# Patient Record
Sex: MALE | Race: WHITE | NOT HISPANIC OR LATINO | Employment: FULL TIME | URBAN - NONMETROPOLITAN AREA
[De-identification: names, ages, dates, MRNs, and addresses within clinical notes are randomized per-mention and may not be internally consistent; named-entity substitution may affect disease eponyms.]

---

## 2017-02-14 PROBLEM — N52.9 ED (ERECTILE DYSFUNCTION): Status: ACTIVE | Noted: 2017-02-14

## 2017-02-14 PROBLEM — D45 POLYCYTHEMIA VERA (HCC): Status: ACTIVE | Noted: 2017-02-14

## 2017-02-14 PROBLEM — E78.5 HYPERLIPIDEMIA: Status: ACTIVE | Noted: 2017-02-14

## 2017-02-14 PROBLEM — E66.3 OVERWEIGHT: Status: ACTIVE | Noted: 2017-02-14

## 2017-02-14 PROBLEM — R73.9 HYPERGLYCEMIA: Status: ACTIVE | Noted: 2017-02-14

## 2017-02-14 PROBLEM — I10 HYPERTENSION: Status: ACTIVE | Noted: 2017-02-14

## 2017-02-16 ENCOUNTER — OFFICE VISIT (OUTPATIENT)
Dept: INTERNAL MEDICINE | Facility: CLINIC | Age: 52
End: 2017-02-16

## 2017-02-16 VITALS
BODY MASS INDEX: 30.64 KG/M2 | RESPIRATION RATE: 14 BRPM | HEART RATE: 68 BPM | DIASTOLIC BLOOD PRESSURE: 78 MMHG | TEMPERATURE: 97.6 F | OXYGEN SATURATION: 98 % | WEIGHT: 214 LBS | HEIGHT: 70 IN | SYSTOLIC BLOOD PRESSURE: 114 MMHG

## 2017-02-16 DIAGNOSIS — E66.3 OVERWEIGHT: ICD-10-CM

## 2017-02-16 DIAGNOSIS — Z23 NEED FOR VACCINATION: ICD-10-CM

## 2017-02-16 DIAGNOSIS — D45 POLYCYTHEMIA VERA (HCC): ICD-10-CM

## 2017-02-16 DIAGNOSIS — R53.83 OTHER FATIGUE: ICD-10-CM

## 2017-02-16 DIAGNOSIS — N52.9 ERECTILE DYSFUNCTION, UNSPECIFIED ERECTILE DYSFUNCTION TYPE: ICD-10-CM

## 2017-02-16 DIAGNOSIS — E78.5 HYPERLIPIDEMIA, UNSPECIFIED HYPERLIPIDEMIA TYPE: Primary | ICD-10-CM

## 2017-02-16 DIAGNOSIS — R73.9 HYPERGLYCEMIA: ICD-10-CM

## 2017-02-16 DIAGNOSIS — I10 ESSENTIAL HYPERTENSION: ICD-10-CM

## 2017-02-16 DIAGNOSIS — E55.9 VITAMIN D DEFICIENCY DISEASE: ICD-10-CM

## 2017-02-16 PROCEDURE — 99396 PREV VISIT EST AGE 40-64: CPT | Performed by: INTERNAL MEDICINE

## 2017-02-16 PROCEDURE — 99213 OFFICE O/P EST LOW 20 MIN: CPT | Performed by: INTERNAL MEDICINE

## 2017-02-16 PROCEDURE — 36415 COLL VENOUS BLD VENIPUNCTURE: CPT | Performed by: INTERNAL MEDICINE

## 2017-02-16 RX ORDER — LISINOPRIL 20 MG/1
20 TABLET ORAL DAILY
Qty: 90 TABLET | Refills: 3 | Status: SHIPPED | OUTPATIENT
Start: 2017-02-16 | End: 2018-02-23 | Stop reason: SDUPTHER

## 2017-02-16 NOTE — PROGRESS NOTES
Subjective   Moreno Lorenzo is a 51 y.o. male and is here for a comprehensive physical exam. Patient here for annual physical. Patient also has multiple medical problems to be followed up. Below is a level III office visit note.  Hypertension stable medication.  Sugar elevated her need a blood test.  Dyslipidemia need a blood test.  Weight is still elevated.  Patient complains of feeling tired times and goes also erectile dysfunction after taking blood pressure medication.  Vitamin D low on supplement to stable.  Abnormal liver enzymes in the past and needs a repeat.    Do you take any herbs or supplements that were not prescribed by a doctor? no  Are you taking calcium supplements? no  Are you taking aspirin daily? no      The following portions of the patient's history were reviewed and updated as appropriate: allergies, current medications, past family history, past medical history, past social history, past surgical history and problem list.      Review of Systems   Constitutional: Negative.    HENT: Negative.    Eyes: Negative.    Respiratory: Negative.    Cardiovascular: Negative.    Gastrointestinal: Negative.    Endocrine: Negative.    Genitourinary: Negative.    Musculoskeletal: Negative.    Skin: Negative.    Allergic/Immunologic: Negative.    Neurological: Negative.    Hematological: Negative.    Psychiatric/Behavioral: Negative.    All other systems reviewed and are negative.        Physical Exam   Constitutional: He is oriented to person, place, and time. He appears well-developed and well-nourished.   HENT:   Head: Normocephalic and atraumatic.   Right Ear: External ear normal.   Left Ear: External ear normal.   Nose: Nose normal.   Mouth/Throat: Oropharynx is clear and moist.   Eyes: Conjunctivae and EOM are normal. Pupils are equal, round, and reactive to light.   Neck: Normal range of motion. Neck supple. No thyromegaly present.   Cardiovascular: Normal rate, regular rhythm, normal heart sounds and  intact distal pulses.    Pulmonary/Chest: Effort normal and breath sounds normal.   Abdominal: Soft. Bowel sounds are normal.   Genitourinary: Rectum normal, prostate normal and penis normal.   Musculoskeletal: Normal range of motion.   Neurological: He is alert and oriented to person, place, and time. He has normal reflexes.   Skin: Skin is warm and dry.   Onychomycosis   Psychiatric: He has a normal mood and affect. His behavior is normal. Judgment and thought content normal.   Nursing note and vitals reviewed.      All  tests have been reviewed.    Assessment/Plan          1. Patient Counseling:  --Nutrition: Stressed importance of moderation in sodium/caffeine intake, saturated fat and cholesterol, caloric balance, sufficient intake of fresh fruits, vegetables, fiber, calcium and iron.  --Exercise: Stressed the importance of regular exercise.   --Injury prevention: Discussed safety belts, safety helmets, smoke detector, smoking near bedding or upholstery.   --Dental health: Discussed importance of regular tooth brushing, flossing, and dental visits.  --Immunizations reviewed.  --Discussed benefits of screening colonoscopy.  --After hours service discussed with patient    2. Discussed the patient's BMI with him.            HTN continue lisinopril 20mg cut down to 10mg due ED and fatigue  decline flu shot.  hyperglycemia repeat normal do lab  Hyperlipidemia diet TG high do lab  overweight diet and exercise,counseling  loss of libido ED  Decrease lisinopril to 10mg  vitD low continue vitD3 1000u daily  onyco no help after medicine by derm  ALT 44, diet, repeat  colonoscopy, refuse now again  Annual physical next year

## 2017-02-20 LAB
25(OH)D3 SERPL-MCNC: 32.5 NG/ML
ALBUMIN SERPL-MCNC: 4.5 G/DL (ref 3.2–4.8)
ALBUMIN/GLOB SERPL: 1.7 G/DL (ref 1.5–2.5)
ALP SERPL-CCNC: 107 U/L (ref 25–100)
ALT SERPL W P-5'-P-CCNC: 42 U/L (ref 7–40)
ANION GAP SERPL CALCULATED.3IONS-SCNC: 6 MMOL/L (ref 3–11)
ARTICHOKE IGE QN: 171 MG/DL (ref 0–130)
AST SERPL-CCNC: 31 U/L (ref 0–33)
BASOPHILS # BLD AUTO: 0.02 10*3/MM3 (ref 0–0.2)
BASOPHILS NFR BLD AUTO: 0.3 % (ref 0–1)
BILIRUB SERPL-MCNC: 0.7 MG/DL (ref 0.3–1.2)
BILIRUB UR QL STRIP: NEGATIVE
BUN BLD-MCNC: 19 MG/DL (ref 9–23)
BUN/CREAT SERPL: 17.3 (ref 7–25)
CALCIUM SPEC-SCNC: 9.6 MG/DL (ref 8.7–10.4)
CHLORIDE SERPL-SCNC: 104 MMOL/L (ref 99–109)
CHOLEST SERPL-MCNC: 255 MG/DL (ref 0–200)
CLARITY UR: CLEAR
CO2 SERPL-SCNC: 30 MMOL/L (ref 20–31)
COLOR UR: YELLOW
CREAT BLD-MCNC: 1.1 MG/DL (ref 0.6–1.3)
DEPRECATED RDW RBC AUTO: 42.3 FL (ref 37–54)
EOSINOPHIL # BLD AUTO: 0.13 10*3/MM3 (ref 0.1–0.3)
EOSINOPHIL NFR BLD AUTO: 2.1 % (ref 0–3)
ERYTHROCYTE [DISTWIDTH] IN BLOOD BY AUTOMATED COUNT: 13.2 % (ref 11.3–14.5)
FOLATE SERPL-MCNC: 12.65 NG/ML (ref 3.2–20)
GFR SERPL CREATININE-BSD FRML MDRD: 70 ML/MIN/1.73
GLOBULIN UR ELPH-MCNC: 2.7 GM/DL
GLUCOSE BLD-MCNC: 89 MG/DL (ref 70–100)
GLUCOSE UR STRIP-MCNC: NEGATIVE MG/DL
HBA1C MFR BLD: 5.6 % (ref 4.8–5.6)
HCT VFR BLD AUTO: 47 % (ref 38.9–50.9)
HCV AB SER DONR QL: NORMAL
HDLC SERPL-MCNC: 36 MG/DL (ref 40–60)
HGB BLD-MCNC: 16.1 G/DL (ref 13.1–17.5)
HGB UR QL STRIP.AUTO: NEGATIVE
IMM GRANULOCYTES # BLD: 0.01 10*3/MM3 (ref 0–0.03)
IMM GRANULOCYTES NFR BLD: 0.2 % (ref 0–0.6)
KETONES UR QL STRIP: NEGATIVE
LEUKOCYTE ESTERASE UR QL STRIP.AUTO: NEGATIVE
LYMPHOCYTES # BLD AUTO: 1.94 10*3/MM3 (ref 0.6–4.8)
LYMPHOCYTES NFR BLD AUTO: 31.1 % (ref 24–44)
MCH RBC QN AUTO: 30 PG (ref 27–31)
MCHC RBC AUTO-ENTMCNC: 34.3 G/DL (ref 32–36)
MCV RBC AUTO: 87.7 FL (ref 80–99)
MONOCYTES # BLD AUTO: 0.39 10*3/MM3 (ref 0–1)
MONOCYTES NFR BLD AUTO: 6.3 % (ref 0–12)
NEUTROPHILS # BLD AUTO: 3.75 10*3/MM3 (ref 1.5–8.3)
NEUTROPHILS NFR BLD AUTO: 60 % (ref 41–71)
NITRITE UR QL STRIP: NEGATIVE
PH UR STRIP.AUTO: <=5 [PH] (ref 5–8)
PLATELET # BLD AUTO: 162 10*3/MM3 (ref 150–450)
PMV BLD AUTO: 9.9 FL (ref 6–12)
POTASSIUM BLD-SCNC: 5 MMOL/L (ref 3.5–5.5)
PROT SERPL-MCNC: 7.2 G/DL (ref 5.7–8.2)
PROT UR QL STRIP: NEGATIVE
PSA SERPL-MCNC: 0.4 NG/ML (ref 0–4)
RBC # BLD AUTO: 5.36 10*6/MM3 (ref 4.2–5.76)
SODIUM BLD-SCNC: 140 MMOL/L (ref 132–146)
SP GR UR STRIP: 1.02 (ref 1–1.03)
TESTOST SERPL-MCNC: 289.93 NG/DL (ref 10–1500)
TRIGL SERPL-MCNC: 353 MG/DL (ref 0–150)
TSH SERPL DL<=0.05 MIU/L-ACNC: 2.14 MIU/ML (ref 0.35–5.35)
UROBILINOGEN UR QL STRIP: NORMAL
VIT B12 BLD-MCNC: 621 PG/ML (ref 211–911)
WBC NRBC COR # BLD: 6.24 10*3/MM3 (ref 3.5–10.8)

## 2017-02-20 PROCEDURE — 81003 URINALYSIS AUTO W/O SCOPE: CPT | Performed by: INTERNAL MEDICINE

## 2017-02-20 PROCEDURE — 90471 IMMUNIZATION ADMIN: CPT | Performed by: INTERNAL MEDICINE

## 2017-02-20 PROCEDURE — 84403 ASSAY OF TOTAL TESTOSTERONE: CPT | Performed by: INTERNAL MEDICINE

## 2017-02-20 PROCEDURE — 80053 COMPREHEN METABOLIC PANEL: CPT | Performed by: INTERNAL MEDICINE

## 2017-02-20 PROCEDURE — 90715 TDAP VACCINE 7 YRS/> IM: CPT | Performed by: INTERNAL MEDICINE

## 2017-02-20 PROCEDURE — 83036 HEMOGLOBIN GLYCOSYLATED A1C: CPT | Performed by: INTERNAL MEDICINE

## 2017-02-20 PROCEDURE — 84153 ASSAY OF PSA TOTAL: CPT | Performed by: INTERNAL MEDICINE

## 2017-02-20 PROCEDURE — 82306 VITAMIN D 25 HYDROXY: CPT | Performed by: INTERNAL MEDICINE

## 2017-02-20 PROCEDURE — 85025 COMPLETE CBC W/AUTO DIFF WBC: CPT | Performed by: INTERNAL MEDICINE

## 2017-02-20 PROCEDURE — 86803 HEPATITIS C AB TEST: CPT | Performed by: INTERNAL MEDICINE

## 2017-02-20 PROCEDURE — 84443 ASSAY THYROID STIM HORMONE: CPT | Performed by: INTERNAL MEDICINE

## 2017-02-20 PROCEDURE — 80061 LIPID PANEL: CPT | Performed by: INTERNAL MEDICINE

## 2017-02-20 PROCEDURE — 82746 ASSAY OF FOLIC ACID SERUM: CPT | Performed by: INTERNAL MEDICINE

## 2017-02-20 PROCEDURE — 82607 VITAMIN B-12: CPT | Performed by: INTERNAL MEDICINE

## 2018-02-23 ENCOUNTER — OFFICE VISIT (OUTPATIENT)
Dept: INTERNAL MEDICINE | Facility: CLINIC | Age: 53
End: 2018-02-23

## 2018-02-23 VITALS
HEIGHT: 70 IN | OXYGEN SATURATION: 96 % | TEMPERATURE: 98.1 F | BODY MASS INDEX: 30.92 KG/M2 | RESPIRATION RATE: 14 BRPM | HEART RATE: 64 BPM | SYSTOLIC BLOOD PRESSURE: 120 MMHG | WEIGHT: 216 LBS | DIASTOLIC BLOOD PRESSURE: 80 MMHG

## 2018-02-23 DIAGNOSIS — R73.9 HYPERGLYCEMIA: ICD-10-CM

## 2018-02-23 DIAGNOSIS — E55.9 VITAMIN D DEFICIENCY DISEASE: ICD-10-CM

## 2018-02-23 DIAGNOSIS — I10 ESSENTIAL HYPERTENSION: ICD-10-CM

## 2018-02-23 DIAGNOSIS — E78.5 HYPERLIPIDEMIA, UNSPECIFIED HYPERLIPIDEMIA TYPE: Primary | ICD-10-CM

## 2018-02-23 DIAGNOSIS — N52.9 ERECTILE DYSFUNCTION, UNSPECIFIED ERECTILE DYSFUNCTION TYPE: ICD-10-CM

## 2018-02-23 DIAGNOSIS — E66.3 OVERWEIGHT: ICD-10-CM

## 2018-02-23 PROBLEM — D45 POLYCYTHEMIA VERA (HCC): Status: RESOLVED | Noted: 2017-02-14 | Resolved: 2018-02-23

## 2018-02-23 PROCEDURE — 99396 PREV VISIT EST AGE 40-64: CPT | Performed by: INTERNAL MEDICINE

## 2018-02-23 RX ORDER — LISINOPRIL 20 MG/1
20 TABLET ORAL DAILY
Qty: 90 TABLET | Refills: 3 | Status: SHIPPED | OUTPATIENT
Start: 2018-02-23 | End: 2018-03-07 | Stop reason: SDUPTHER

## 2018-02-23 NOTE — PROGRESS NOTES
Zofia Lorenzo is a 52 y.o. male and is here for a comprehensive physical exam.     Do you take any herbs or supplements that were not prescribed by a doctor? no  Are you taking calcium supplements? no  Are you taking aspirin daily? no      The following portions of the patient's history were reviewed and updated as appropriate: allergies, current medications, past family history, past medical history, past social history, past surgical history and problem list.      Review of Systems   Constitutional: Negative.    HENT: Negative.    Eyes: Negative.    Respiratory: Negative.    Cardiovascular: Negative.    Gastrointestinal: Negative.    Endocrine: Negative.    Genitourinary: Negative.    Musculoskeletal: Negative.    Skin: Negative.    Allergic/Immunologic: Negative.    Neurological: Negative.    Hematological: Negative.    Psychiatric/Behavioral: Negative.    All other systems reviewed and are negative.        Physical Exam   Constitutional: He is oriented to person, place, and time. He appears well-developed and well-nourished.   HENT:   Head: Normocephalic and atraumatic.   Right Ear: External ear normal.   Left Ear: External ear normal.   Nose: Nose normal.   Mouth/Throat: Oropharynx is clear and moist.   Eyes: Conjunctivae and EOM are normal. Pupils are equal, round, and reactive to light.   Neck: Normal range of motion. Neck supple. No thyromegaly present.   Cardiovascular: Normal rate, regular rhythm, normal heart sounds and intact distal pulses.    Pulmonary/Chest: Effort normal and breath sounds normal.   Abdominal: Soft. Bowel sounds are normal.   Genitourinary: Rectum normal, prostate normal and penis normal.   Musculoskeletal: Normal range of motion.   Neurological: He is alert and oriented to person, place, and time. He has normal reflexes.   Skin: Skin is warm and dry.   Psychiatric: He has a normal mood and affect. His behavior is normal. Judgment and thought content normal.   Nursing note  and vitals reviewed.      All  tests have been reviewed.    Assessment/Plan          1. Patient Counseling:  --Nutrition: Stressed importance of moderation in sodium/caffeine intake, saturated fat and cholesterol, caloric balance, sufficient intake of fresh fruits, vegetables, fiber, calcium and iron.  --Exercise: Stressed the importance of regular exercise.   --Injury prevention: Discussed safety belts, safety helmets, smoke detector, smoking near bedding or upholstery.   --Dental health: Discussed importance of regular tooth brushing, flossing, and dental visits.  --Immunizations reviewed.  --Discussed benefits of screening colonoscopy.  --After hours service discussed with patient    2. Discussed the patient's BMI with him.            HTN continue lisinopril 10mg,   decline flu shot.  hyperglycemia repeat normal do lab  Hyperlipidemia diet TG high do lab  overweight diet and exercise,counseling  libido ED  Do labs  vitD low continue vitD3 1000u daily  onyco no help after medicine by derm  ALT 44, diet, repeat  colonoscopy, refuse now again 2/2018 explained risk of cancer for early detection  Annual physical next year

## 2018-02-24 LAB
25(OH)D3+25(OH)D2 SERPL-MCNC: 50.8 NG/ML
ALBUMIN SERPL-MCNC: 4.4 G/DL (ref 3.5–5)
ALBUMIN/GLOB SERPL: 1.6 G/DL (ref 1–2)
ALP SERPL-CCNC: 87 U/L (ref 38–126)
ALT SERPL-CCNC: 84 U/L (ref 13–69)
APPEARANCE UR: CLEAR
AST SERPL-CCNC: 56 U/L (ref 15–46)
BASOPHILS # BLD AUTO: 0.03 10*3/MM3 (ref 0–0.2)
BASOPHILS NFR BLD AUTO: 0.5 % (ref 0–2.5)
BILIRUB SERPL-MCNC: 0.5 MG/DL (ref 0.2–1.3)
BILIRUB UR QL STRIP: NEGATIVE
BUN SERPL-MCNC: 15 MG/DL (ref 7–20)
BUN/CREAT SERPL: 13.6 (ref 6.3–21.9)
CALCIUM SERPL-MCNC: 9.1 MG/DL (ref 8.4–10.2)
CHLORIDE SERPL-SCNC: 104 MMOL/L (ref 98–107)
CHOLEST SERPL-MCNC: 207 MG/DL (ref 0–199)
CO2 SERPL-SCNC: 26 MMOL/L (ref 26–30)
COLOR UR: YELLOW
CREAT SERPL-MCNC: 1.1 MG/DL (ref 0.6–1.3)
EOSINOPHIL # BLD AUTO: 0.11 10*3/MM3 (ref 0–0.7)
EOSINOPHIL NFR BLD AUTO: 2 % (ref 0–7)
ERYTHROCYTE [DISTWIDTH] IN BLOOD BY AUTOMATED COUNT: 12.5 % (ref 11.5–14.5)
GFR SERPLBLD CREATININE-BSD FMLA CKD-EPI: 70 ML/MIN/1.73
GFR SERPLBLD CREATININE-BSD FMLA CKD-EPI: 85 ML/MIN/1.73
GLOBULIN SER CALC-MCNC: 2.8 GM/DL
GLUCOSE SERPL-MCNC: 93 MG/DL (ref 74–98)
GLUCOSE UR QL: NEGATIVE
HBA1C MFR BLD: 5.6 %
HCT VFR BLD AUTO: 51 % (ref 42–52)
HDLC SERPL-MCNC: 21 MG/DL (ref 40–60)
HGB BLD-MCNC: 17.7 G/DL (ref 14–18)
HGB UR QL STRIP: NEGATIVE
IMM GRANULOCYTES # BLD: 0.01 10*3/MM3 (ref 0–0.06)
IMM GRANULOCYTES NFR BLD: 0.2 % (ref 0–0.6)
KETONES UR QL STRIP: NEGATIVE
LDLC SERPL CALC-MCNC: 137 MG/DL (ref 0–99)
LEUKOCYTE ESTERASE UR QL STRIP: NEGATIVE
LYMPHOCYTES # BLD AUTO: 1.47 10*3/MM3 (ref 0.6–3.4)
LYMPHOCYTES NFR BLD AUTO: 26.9 % (ref 10–50)
MCH RBC QN AUTO: 29.7 PG (ref 27–31)
MCHC RBC AUTO-ENTMCNC: 34.7 G/DL (ref 30–37)
MCV RBC AUTO: 85.6 FL (ref 80–94)
MONOCYTES # BLD AUTO: 0.49 10*3/MM3 (ref 0–0.9)
MONOCYTES NFR BLD AUTO: 9 % (ref 0–12)
NEUTROPHILS # BLD AUTO: 3.36 10*3/MM3 (ref 2–6.9)
NEUTROPHILS NFR BLD AUTO: 61.4 % (ref 37–80)
NITRITE UR QL STRIP: NEGATIVE
NRBC BLD AUTO-RTO: 0 /100 WBC (ref 0–0)
PH UR STRIP: 6 [PH] (ref 5–8)
PLATELET # BLD AUTO: 223 10*3/MM3 (ref 130–400)
POTASSIUM SERPL-SCNC: 4.6 MMOL/L (ref 3.5–5.1)
PROLACTIN SERPL-MCNC: 10.9 NG/ML (ref 4–15.2)
PROT SERPL-MCNC: 7.2 G/DL (ref 6.3–8.2)
PROT UR QL STRIP: NEGATIVE
RBC # BLD AUTO: 5.96 10*6/MM3 (ref 4.7–6.1)
SODIUM SERPL-SCNC: 145 MMOL/L (ref 137–145)
SP GR UR: NORMAL (ref 1–1.03)
TESTOST SERPL-MCNC: 1066 NG/DL (ref 264–916)
TRIGL SERPL-MCNC: 245 MG/DL
TSH SERPL DL<=0.005 MIU/L-ACNC: 2.21 MIU/ML (ref 0.47–4.68)
UROBILINOGEN UR STRIP-MCNC: NORMAL MG/DL
VLDLC SERPL CALC-MCNC: 49 MG/DL
WBC # BLD AUTO: 5.47 10*3/MM3 (ref 4.8–10.8)

## 2018-02-28 ENCOUNTER — RESULTS ENCOUNTER (OUTPATIENT)
Dept: INTERNAL MEDICINE | Facility: CLINIC | Age: 53
End: 2018-02-28

## 2018-02-28 DIAGNOSIS — E66.3 OVERWEIGHT: ICD-10-CM

## 2018-02-28 DIAGNOSIS — I10 ESSENTIAL HYPERTENSION: ICD-10-CM

## 2018-02-28 DIAGNOSIS — E78.5 HYPERLIPIDEMIA, UNSPECIFIED HYPERLIPIDEMIA TYPE: ICD-10-CM

## 2018-02-28 DIAGNOSIS — N52.9 ERECTILE DYSFUNCTION, UNSPECIFIED ERECTILE DYSFUNCTION TYPE: ICD-10-CM

## 2018-02-28 DIAGNOSIS — R73.9 HYPERGLYCEMIA: ICD-10-CM

## 2018-03-07 ENCOUNTER — OFFICE VISIT (OUTPATIENT)
Dept: INTERNAL MEDICINE | Facility: CLINIC | Age: 53
End: 2018-03-07

## 2018-03-07 VITALS
DIASTOLIC BLOOD PRESSURE: 98 MMHG | WEIGHT: 216 LBS | BODY MASS INDEX: 30.92 KG/M2 | HEART RATE: 80 BPM | SYSTOLIC BLOOD PRESSURE: 150 MMHG | RESPIRATION RATE: 14 BRPM | OXYGEN SATURATION: 95 % | TEMPERATURE: 97.9 F | HEIGHT: 70 IN

## 2018-03-07 DIAGNOSIS — N52.9 ERECTILE DYSFUNCTION, UNSPECIFIED ERECTILE DYSFUNCTION TYPE: ICD-10-CM

## 2018-03-07 DIAGNOSIS — E66.3 OVERWEIGHT: ICD-10-CM

## 2018-03-07 DIAGNOSIS — R74.8 ABNORMAL LIVER ENZYMES: ICD-10-CM

## 2018-03-07 DIAGNOSIS — E78.5 HYPERLIPIDEMIA, UNSPECIFIED HYPERLIPIDEMIA TYPE: Primary | ICD-10-CM

## 2018-03-07 DIAGNOSIS — R73.9 HYPERGLYCEMIA: ICD-10-CM

## 2018-03-07 DIAGNOSIS — E55.9 VITAMIN D DEFICIENCY DISEASE: ICD-10-CM

## 2018-03-07 DIAGNOSIS — I10 ESSENTIAL HYPERTENSION: ICD-10-CM

## 2018-03-07 PROCEDURE — 99214 OFFICE O/P EST MOD 30 MIN: CPT | Performed by: INTERNAL MEDICINE

## 2018-03-07 RX ORDER — TESTOSTERONE 16.2 MG/G
GEL TRANSDERMAL
Qty: 75 G | Refills: 3 | Status: SHIPPED | OUTPATIENT
Start: 2018-03-07 | End: 2018-05-17 | Stop reason: SDUPTHER

## 2018-03-07 RX ORDER — ABACAVIR , LAMIVUDINE AND ZIDOVUDINE 150; 300; 300 MG/1; MG/1; MG/1
1 TABLET ORAL 2 TIMES DAILY
COMMUNITY
End: 2018-09-21

## 2018-03-07 RX ORDER — TADALAFIL 5 MG/1
5 TABLET ORAL DAILY PRN
Qty: 30 TABLET | Refills: 2 | Status: SHIPPED | OUTPATIENT
Start: 2018-03-07 | End: 2018-09-21

## 2018-03-07 RX ORDER — LISINOPRIL 20 MG/1
20 TABLET ORAL DAILY
Qty: 90 TABLET | Refills: 3 | Status: SHIPPED | OUTPATIENT
Start: 2018-03-07 | End: 2019-04-14 | Stop reason: SDUPTHER

## 2018-03-07 NOTE — PROGRESS NOTES
Zofia Lorenzo is a 52 y.o. male.     Chief Complaint   Patient presents with   • Follow-up   • Labs Only       History of Present Illness   Patient here for follow-up.  Blood pressure elevated today.  Patient states stress at work.  Patient is taking lisinopril 10 mg daily.  Sugar repeat normal.  Cholesterol elevated.  Weight is still high.  Libido erectile dysfunction no significant improvement after testosterone over-the-counter supplement.  Onychomycosis is status post Lamisil now patient has abnormal liver enzymes.    Current Outpatient Prescriptions:   •  lisinopril (PRINIVIL,ZESTRIL) 20 MG tablet, Take 1 tablet by mouth Daily., Disp: 90 tablet, Rfl: 3  •  abacavir-lamiVUDine-zidovudine (TRIZIVIR) 300-150-300 MG per tablet, Take 1 tablet by mouth 2 (Two) Times a Day., Disp: , Rfl:     The following portions of the patient's history were reviewed and updated as appropriate: allergies, current medications, past family history, past medical history, past social history, past surgical history and problem list.    Review of Systems   Constitutional: Negative.    Respiratory: Negative.    Cardiovascular: Negative.    Gastrointestinal: Negative.    Musculoskeletal: Negative.    Skin: Negative.    Neurological: Negative.    Psychiatric/Behavioral: Negative.        Objective   Physical Exam   Constitutional: He is oriented to person, place, and time. He appears well-nourished.   Neck: Neck supple.   Cardiovascular: Normal rate, regular rhythm and normal heart sounds.    Pulmonary/Chest: Effort normal and breath sounds normal.   Abdominal: Bowel sounds are normal.   Neurological: He is alert and oriented to person, place, and time.   Skin: Skin is warm.   Psychiatric: He has a normal mood and affect.       All tests have been reviewed.    Assessment/Plan   There are no diagnoses linked to this encounter.          HTN continue lisinopril 10mg, increase to 20--  decline flu shot.  hyperglycemia repeat normal    Hyperlipidemia diet TG high diet  overweight diet and exercise,counseling  libido ED start cialis daily patient requests, testo no help  Hypogonadism patient is taking testo supplement over the counter, encourage patient to d/c and start androgel.--  vitD low continue vitD3 1000u daily change to qod  Onyco s/p lamisil for 3 mo now liver enzymes elevated. Watch for now.  colonoscopy, refuse now again 2/2018 explained risk of cancer for early detection  10 week after labs

## 2018-03-12 ENCOUNTER — RESULTS ENCOUNTER (OUTPATIENT)
Dept: INTERNAL MEDICINE | Facility: CLINIC | Age: 53
End: 2018-03-12

## 2018-03-12 DIAGNOSIS — I10 ESSENTIAL HYPERTENSION: ICD-10-CM

## 2018-03-12 DIAGNOSIS — R73.9 HYPERGLYCEMIA: ICD-10-CM

## 2018-03-12 DIAGNOSIS — E55.9 VITAMIN D DEFICIENCY DISEASE: ICD-10-CM

## 2018-03-12 DIAGNOSIS — E78.5 HYPERLIPIDEMIA, UNSPECIFIED HYPERLIPIDEMIA TYPE: ICD-10-CM

## 2018-03-12 DIAGNOSIS — R74.8 ABNORMAL LIVER ENZYMES: ICD-10-CM

## 2018-05-15 LAB
ALBUMIN SERPL-MCNC: 4.1 G/DL (ref 3.5–5)
ALP SERPL-CCNC: 79 U/L (ref 38–126)
ALT SERPL-CCNC: 69 U/L (ref 13–69)
AST SERPL-CCNC: 46 U/L (ref 15–46)
BILIRUB DIRECT SERPL-MCNC: 0.2 MG/DL (ref 0–0.4)
BILIRUB SERPL-MCNC: 0.5 MG/DL (ref 0.2–1.3)
BUN SERPL-MCNC: 20 MG/DL (ref 7–20)
BUN/CREAT SERPL: 20 (ref 6.3–21.9)
CALCIUM SERPL-MCNC: 8.7 MG/DL (ref 8.4–10.2)
CHLORIDE SERPL-SCNC: 103 MMOL/L (ref 98–107)
CO2 SERPL-SCNC: 27 MMOL/L (ref 26–30)
CREAT SERPL-MCNC: 1 MG/DL (ref 0.6–1.3)
GFR SERPLBLD CREATININE-BSD FMLA CKD-EPI: 78 ML/MIN/1.73
GFR SERPLBLD CREATININE-BSD FMLA CKD-EPI: 94 ML/MIN/1.73
GLUCOSE SERPL-MCNC: 103 MG/DL (ref 74–98)
POTASSIUM SERPL-SCNC: 4.3 MMOL/L (ref 3.5–5.1)
PROT SERPL-MCNC: 6.9 G/DL (ref 6.3–8.2)
PSA SERPL-MCNC: 0.44 NG/ML (ref 0.06–4)
SODIUM SERPL-SCNC: 142 MMOL/L (ref 137–145)

## 2018-05-16 LAB
25(OH)D3+25(OH)D2 SERPL-MCNC: 35.1 NG/ML
TESTOST SERPL-MCNC: 273 NG/DL (ref 264–916)

## 2018-05-17 ENCOUNTER — OFFICE VISIT (OUTPATIENT)
Dept: INTERNAL MEDICINE | Facility: CLINIC | Age: 53
End: 2018-05-17

## 2018-05-17 VITALS
TEMPERATURE: 97.8 F | HEIGHT: 70 IN | DIASTOLIC BLOOD PRESSURE: 80 MMHG | RESPIRATION RATE: 12 BRPM | OXYGEN SATURATION: 99 % | BODY MASS INDEX: 30.64 KG/M2 | HEART RATE: 63 BPM | WEIGHT: 214 LBS | SYSTOLIC BLOOD PRESSURE: 122 MMHG

## 2018-05-17 DIAGNOSIS — E55.9 VITAMIN D DEFICIENCY DISEASE: ICD-10-CM

## 2018-05-17 DIAGNOSIS — E29.1 HYPOGONADISM IN MALE: ICD-10-CM

## 2018-05-17 DIAGNOSIS — E66.3 OVERWEIGHT: ICD-10-CM

## 2018-05-17 DIAGNOSIS — N52.9 ERECTILE DYSFUNCTION, UNSPECIFIED ERECTILE DYSFUNCTION TYPE: ICD-10-CM

## 2018-05-17 DIAGNOSIS — E78.5 HYPERLIPIDEMIA, UNSPECIFIED HYPERLIPIDEMIA TYPE: Primary | ICD-10-CM

## 2018-05-17 DIAGNOSIS — R73.9 HYPERGLYCEMIA: ICD-10-CM

## 2018-05-17 DIAGNOSIS — I10 ESSENTIAL HYPERTENSION: ICD-10-CM

## 2018-05-17 PROCEDURE — 99214 OFFICE O/P EST MOD 30 MIN: CPT | Performed by: INTERNAL MEDICINE

## 2018-05-17 RX ORDER — TESTOSTERONE 16.2 MG/G
GEL TRANSDERMAL
Qty: 75 G | Refills: 3 | Status: SHIPPED | OUTPATIENT
Start: 2018-05-17 | End: 2018-09-21

## 2018-05-17 NOTE — PROGRESS NOTES
Zofia Lorenzo is a 52 y.o. male.     Chief Complaint   Patient presents with   • Follow-up     follow up on labs       History of Present Illness   Patient here for follow-up.  Hypertension stable after increase medication.  Hyperglycemia stable on diet 2.  Hyperlipidemia stable diet 2.  Overweight to stable.  Hypogonadism patient is not taking testosterone supplement and now lab showed a testosterone level low.  Vitamin D elevated.    Current Outpatient Prescriptions:   •  abacavir-lamiVUDine-zidovudine (TRIZIVIR) 300-150-300 MG per tablet, Take 1 tablet by mouth 2 (Two) Times a Day., Disp: , Rfl:   •  lisinopril (PRINIVIL,ZESTRIL) 20 MG tablet, Take 1 tablet by mouth Daily., Disp: 90 tablet, Rfl: 3  •  tadalafil (CIALIS) 5 MG tablet, Take 1 tablet by mouth Daily As Needed for erectile dysfunction., Disp: 30 tablet, Rfl: 2  •  Testosterone (ANDROGEL PUMP) 20.25 MG/ACT (1.62%) gel, 1 pump each axilla daily, Disp: 75 g, Rfl: 3    The following portions of the patient's history were reviewed and updated as appropriate: allergies, current medications, past family history, past medical history, past social history, past surgical history and problem list.    Review of Systems   Constitutional: Negative.    Respiratory: Negative.    Cardiovascular: Negative.    Gastrointestinal: Negative.    Musculoskeletal: Negative.    Skin: Negative.    Neurological: Negative.    Psychiatric/Behavioral: Negative.        Objective   Physical Exam   Constitutional: He is oriented to person, place, and time. He appears well-developed and well-nourished.   Neck: Neck supple.   Cardiovascular: Normal rate, regular rhythm and normal heart sounds.    Pulmonary/Chest: Effort normal and breath sounds normal.   Abdominal: Soft. Bowel sounds are normal.   Neurological: He is alert and oriented to person, place, and time.   Psychiatric: He has a normal mood and affect. His behavior is normal.       All tests have been  reviewed.    Assessment/Plan   There are no diagnoses linked to this encounter.          HTN continue lisinopril 20--  decline flu shot.  hyperglycemia repeat normal   Hyperlipidemia  diet  overweight diet and exercise  libido ED declined cialis daily  Hypogonadism patient is taking testo supplement over the counter, encourage patient to d/c and start androgel.--  vitD low continue vitD3 1000u daily change to qod  Onyco s/p lamisil for 3 mo now liver enzymes elevated. Watch for now.  colonoscopy, refuse now again 2/2018 explained risk of cancer for early detection  16 week after labs

## 2018-05-22 ENCOUNTER — RESULTS ENCOUNTER (OUTPATIENT)
Dept: INTERNAL MEDICINE | Facility: CLINIC | Age: 53
End: 2018-05-22

## 2018-05-22 DIAGNOSIS — E29.1 HYPOGONADISM IN MALE: ICD-10-CM

## 2018-05-22 DIAGNOSIS — E78.5 HYPERLIPIDEMIA, UNSPECIFIED HYPERLIPIDEMIA TYPE: ICD-10-CM

## 2018-05-22 DIAGNOSIS — R73.9 HYPERGLYCEMIA: ICD-10-CM

## 2018-09-14 LAB — PSA SERPL-MCNC: 0.44 NG/ML (ref 0.06–4)

## 2018-09-15 LAB
CHOLEST SERPL-MCNC: 237 MG/DL (ref 0–199)
HBA1C MFR BLD: 5.5 %
HDLC SERPL-MCNC: 26 MG/DL (ref 40–60)
LDLC SERPL CALC-MCNC: 171 MG/DL (ref 0–99)
TESTOST SERPL-MCNC: 287 NG/DL (ref 264–916)
TRIGL SERPL-MCNC: 198 MG/DL
VLDLC SERPL CALC-MCNC: 39.6 MG/DL

## 2018-09-21 ENCOUNTER — RESULTS ENCOUNTER (OUTPATIENT)
Dept: INTERNAL MEDICINE | Facility: CLINIC | Age: 53
End: 2018-09-21

## 2018-09-21 ENCOUNTER — OFFICE VISIT (OUTPATIENT)
Dept: INTERNAL MEDICINE | Facility: CLINIC | Age: 53
End: 2018-09-21

## 2018-09-21 VITALS
BODY MASS INDEX: 31.07 KG/M2 | SYSTOLIC BLOOD PRESSURE: 132 MMHG | DIASTOLIC BLOOD PRESSURE: 84 MMHG | HEIGHT: 70 IN | WEIGHT: 217 LBS | HEART RATE: 62 BPM | OXYGEN SATURATION: 96 %

## 2018-09-21 DIAGNOSIS — E66.3 OVERWEIGHT: ICD-10-CM

## 2018-09-21 DIAGNOSIS — R73.9 HYPERGLYCEMIA: ICD-10-CM

## 2018-09-21 DIAGNOSIS — E55.9 VITAMIN D DEFICIENCY DISEASE: ICD-10-CM

## 2018-09-21 DIAGNOSIS — E78.5 HYPERLIPIDEMIA, UNSPECIFIED HYPERLIPIDEMIA TYPE: Primary | ICD-10-CM

## 2018-09-21 DIAGNOSIS — I10 ESSENTIAL HYPERTENSION: ICD-10-CM

## 2018-09-21 DIAGNOSIS — Z12.11 COLON CANCER SCREENING: ICD-10-CM

## 2018-09-21 DIAGNOSIS — N52.9 ERECTILE DYSFUNCTION, UNSPECIFIED ERECTILE DYSFUNCTION TYPE: ICD-10-CM

## 2018-09-21 PROBLEM — R74.8 ABNORMAL LIVER ENZYMES: Status: RESOLVED | Noted: 2018-03-07 | Resolved: 2018-09-21

## 2018-09-21 PROCEDURE — 99214 OFFICE O/P EST MOD 30 MIN: CPT | Performed by: INTERNAL MEDICINE

## 2018-09-21 RX ORDER — TESTOSTERONE 40.5 MG/2.5G
GEL TOPICAL
Qty: 2.5 G | Refills: 5 | Status: SHIPPED | OUTPATIENT
Start: 2018-09-21 | End: 2020-07-02

## 2018-09-21 RX ORDER — KETOCONAZOLE 20 MG/G
CREAM TOPICAL
COMMUNITY
Start: 2018-09-18 | End: 2020-07-02

## 2018-09-21 NOTE — PROGRESS NOTES
Zofia Lorenzo is a 53 y.o. male.     Chief Complaint   Patient presents with   • Follow-up       History of Present Illness   Patient here for follow-up of.  Hypertension stable medication.  Hyperglycemia repeat normal.  Hyperlipidemia cholesterol still elevated.  Weight is still high.  Hypogonadism testosterone is low on medication.  Onychomycosis stable now.    Current Outpatient Prescriptions:   •  ketoconazole (NIZORAL) 2 % cream, , Disp: , Rfl:   •  lisinopril (PRINIVIL,ZESTRIL) 20 MG tablet, Take 1 tablet by mouth Daily., Disp: 90 tablet, Rfl: 3  •  Testosterone (ANDROGEL PUMP) 20.25 MG/ACT (1.62%) gel, 1 pump each axilla daily, Disp: 75 g, Rfl: 3    The following portions of the patient's history were reviewed and updated as appropriate: allergies, current medications, past family history, past medical history, past social history, past surgical history and problem list.    Review of Systems   Constitutional: Negative.    Respiratory: Negative.    Cardiovascular: Negative.    Gastrointestinal: Negative.    Musculoskeletal: Negative.    Skin: Negative.    Neurological: Negative.    Psychiatric/Behavioral: Negative.        Objective   Physical Exam   Constitutional: He is oriented to person, place, and time. He appears well-nourished.   Neck: Neck supple.   Cardiovascular: Normal rate, regular rhythm and normal heart sounds.    Pulmonary/Chest: Effort normal and breath sounds normal.   Abdominal: Bowel sounds are normal.   Neurological: He is alert and oriented to person, place, and time.   Skin: Skin is warm.   Psychiatric: He has a normal mood and affect.       All tests have been reviewed.    Assessment/Plan   There are no diagnoses linked to this encounter.           HTN continue lisinopril 20--  decline flu shot.  hyperglycemia repeat normal   Hyperlipidemia  diet repeat next if still high start medicine  overweight diet and exercise  libido ED declined cialis daily  Hypogonadism patient is  taking testo supplement over the counter, encourage patient to d/c and  androgel, increase to 40.5.--  vitD low continue vitD3 1000u daily changed to qod  Onyco s/p lamisil for 3 mo now liver enzymes elevated.failed.  Watch for now.  colonoscopy, refuse now again 2/2018 explained risk of cancer for early detection. Do cologuard.       2/2019 PE

## 2019-01-21 ENCOUNTER — PRIOR AUTHORIZATION (OUTPATIENT)
Dept: INTERNAL MEDICINE | Facility: CLINIC | Age: 54
End: 2019-01-21

## 2019-03-01 DIAGNOSIS — R73.9 HYPERGLYCEMIA: ICD-10-CM

## 2019-03-01 DIAGNOSIS — E55.9 VITAMIN D DEFICIENCY DISEASE: ICD-10-CM

## 2019-03-01 DIAGNOSIS — E29.1 HYPOGONADISM IN MALE: ICD-10-CM

## 2019-03-01 DIAGNOSIS — I10 ESSENTIAL HYPERTENSION: ICD-10-CM

## 2019-03-01 DIAGNOSIS — E78.5 HYPERLIPIDEMIA, UNSPECIFIED HYPERLIPIDEMIA TYPE: Primary | ICD-10-CM

## 2019-03-02 LAB
25(OH)D3+25(OH)D2 SERPL-MCNC: 42.5 NG/ML (ref 30–100)
ALBUMIN SERPL-MCNC: 4.9 G/DL (ref 3.5–5.5)
ALBUMIN/GLOB SERPL: 1.8 {RATIO} (ref 1.2–2.2)
ALP SERPL-CCNC: 115 IU/L (ref 39–117)
ALT SERPL-CCNC: 22 IU/L (ref 0–44)
AST SERPL-CCNC: 21 IU/L (ref 0–40)
BASOPHILS # BLD AUTO: 0 X10E3/UL (ref 0–0.2)
BASOPHILS NFR BLD AUTO: 1 %
BILIRUB SERPL-MCNC: 0.5 MG/DL (ref 0–1.2)
BUN SERPL-MCNC: 19 MG/DL (ref 6–24)
BUN/CREAT SERPL: 18 (ref 9–20)
CALCIUM SERPL-MCNC: 9.3 MG/DL (ref 8.7–10.2)
CHLORIDE SERPL-SCNC: 100 MMOL/L (ref 96–106)
CHOLEST SERPL-MCNC: 273 MG/DL (ref 100–199)
CO2 SERPL-SCNC: 23 MMOL/L (ref 20–29)
CREAT SERPL-MCNC: 1.04 MG/DL (ref 0.76–1.27)
EOSINOPHIL # BLD AUTO: 0.2 X10E3/UL (ref 0–0.4)
EOSINOPHIL NFR BLD AUTO: 2 %
ERYTHROCYTE [DISTWIDTH] IN BLOOD BY AUTOMATED COUNT: 13.7 % (ref 12.3–15.4)
GLOBULIN SER CALC-MCNC: 2.8 G/DL (ref 1.5–4.5)
GLUCOSE SERPL-MCNC: 105 MG/DL (ref 65–99)
HBA1C MFR BLD: 5.9 % (ref 4.8–5.6)
HCT VFR BLD AUTO: 47.6 % (ref 37.5–51)
HDLC SERPL-MCNC: 31 MG/DL
HGB BLD-MCNC: 16.3 G/DL (ref 13–17.7)
IMM GRANULOCYTES # BLD AUTO: 0 X10E3/UL (ref 0–0.1)
IMM GRANULOCYTES NFR BLD AUTO: 0 %
LABORATORY COMMENT REPORT: ABNORMAL
LDLC SERPL CALC-MCNC: 208 MG/DL (ref 0–99)
LYMPHOCYTES # BLD AUTO: 2.1 X10E3/UL (ref 0.7–3.1)
LYMPHOCYTES NFR BLD AUTO: 32 %
MCH RBC QN AUTO: 29.9 PG (ref 26.6–33)
MCHC RBC AUTO-ENTMCNC: 34.2 G/DL (ref 31.5–35.7)
MCV RBC AUTO: 87 FL (ref 79–97)
MONOCYTES # BLD AUTO: 0.5 X10E3/UL (ref 0.1–0.9)
MONOCYTES NFR BLD AUTO: 7 %
NEUTROPHILS # BLD AUTO: 3.8 X10E3/UL (ref 1.4–7)
NEUTROPHILS NFR BLD AUTO: 58 %
PLATELET # BLD AUTO: 222 X10E3/UL (ref 150–379)
POTASSIUM SERPL-SCNC: 5 MMOL/L (ref 3.5–5.2)
PROT SERPL-MCNC: 7.7 G/DL (ref 6–8.5)
PSA SERPL-MCNC: 0.5 NG/ML (ref 0–4)
RBC # BLD AUTO: 5.46 X10E6/UL (ref 4.14–5.8)
SODIUM SERPL-SCNC: 140 MMOL/L (ref 134–144)
TESTOST SERPL-MCNC: 289 NG/DL (ref 264–916)
TRIGL SERPL-MCNC: 171 MG/DL (ref 0–149)
VLDLC SERPL CALC-MCNC: 34 MG/DL (ref 5–40)
WBC # BLD AUTO: 6.6 X10E3/UL (ref 3.4–10.8)

## 2019-03-08 ENCOUNTER — RESULTS ENCOUNTER (OUTPATIENT)
Dept: INTERNAL MEDICINE | Facility: CLINIC | Age: 54
End: 2019-03-08

## 2019-03-08 ENCOUNTER — OFFICE VISIT (OUTPATIENT)
Dept: INTERNAL MEDICINE | Facility: CLINIC | Age: 54
End: 2019-03-08

## 2019-03-08 VITALS
WEIGHT: 211.8 LBS | DIASTOLIC BLOOD PRESSURE: 80 MMHG | HEART RATE: 71 BPM | BODY MASS INDEX: 30.32 KG/M2 | TEMPERATURE: 97.8 F | SYSTOLIC BLOOD PRESSURE: 128 MMHG | OXYGEN SATURATION: 97 % | HEIGHT: 70 IN

## 2019-03-08 DIAGNOSIS — E66.3 OVERWEIGHT: ICD-10-CM

## 2019-03-08 DIAGNOSIS — R73.9 HYPERGLYCEMIA: ICD-10-CM

## 2019-03-08 DIAGNOSIS — N52.9 ERECTILE DYSFUNCTION, UNSPECIFIED ERECTILE DYSFUNCTION TYPE: ICD-10-CM

## 2019-03-08 DIAGNOSIS — I10 ESSENTIAL HYPERTENSION: ICD-10-CM

## 2019-03-08 DIAGNOSIS — Z12.11 COLON CANCER SCREENING: ICD-10-CM

## 2019-03-08 DIAGNOSIS — E78.5 HYPERLIPIDEMIA, UNSPECIFIED HYPERLIPIDEMIA TYPE: ICD-10-CM

## 2019-03-08 DIAGNOSIS — E55.9 VITAMIN D DEFICIENCY DISEASE: ICD-10-CM

## 2019-03-08 DIAGNOSIS — E78.5 HYPERLIPIDEMIA, UNSPECIFIED HYPERLIPIDEMIA TYPE: Primary | ICD-10-CM

## 2019-03-08 PROCEDURE — 99396 PREV VISIT EST AGE 40-64: CPT | Performed by: INTERNAL MEDICINE

## 2019-03-08 NOTE — PROGRESS NOTES
Zofia Lorenzo is a 53 y.o. male.     Chief Complaint   Patient presents with   • Annual Exam       History of Present Illness   Patient is a 54 y/o male presenting today for annual physical. HTN controlled on med. Hyperglycemia still elevated. Better diet and exercise. HLD elevated, declines medication. Low testosterone, hasn't been on androgel for past 3 weeks due to insurance. Cologuard, never received, repeat. Obesity, weight down 6 pounds.     Current Outpatient Medications:   •  ketoconazole (NIZORAL) 2 % cream, , Disp: , Rfl:   •  lisinopril (PRINIVIL,ZESTRIL) 20 MG tablet, Take 1 tablet by mouth Daily., Disp: 90 tablet, Rfl: 3  •  Testosterone (ANDROGEL) 40.5 MG/2.5GM (1.62%) gel, 2 gi each axilla daily, Disp: 2.5 g, Rfl: 5    The following portions of the patient's history were reviewed and updated as appropriate: allergies, current medications, past family history, past medical history, past social history, past surgical history and problem list.    Review of Systems   Constitutional: Negative.    Respiratory: Negative.    Cardiovascular: Negative.    Gastrointestinal: Negative.    Skin: Negative.    Psychiatric/Behavioral: Negative.        Objective   Physical Exam   Constitutional: He is oriented to person, place, and time. He appears well-developed and well-nourished.   HENT:   Head: Normocephalic and atraumatic.   Right Ear: External ear normal.   Left Ear: External ear normal.   Nose: Nose normal.   Mouth/Throat: Oropharynx is clear and moist.   Eyes: Conjunctivae and EOM are normal. Pupils are equal, round, and reactive to light.   Neck: Normal range of motion. Neck supple. No thyromegaly present.   Cardiovascular: Normal rate, regular rhythm, normal heart sounds and intact distal pulses.   Pulmonary/Chest: Effort normal and breath sounds normal.   Abdominal: Soft. Bowel sounds are normal.   Genitourinary: Rectum normal, prostate normal and penis normal.   Musculoskeletal: Normal range  of motion.   Neurological: He is alert and oriented to person, place, and time. He has normal reflexes.   Skin: Skin is warm and dry.   Psychiatric: He has a normal mood and affect. His behavior is normal. Judgment and thought content normal.   Nursing note and vitals reviewed.      All tests have been reviewed.    Assessment/Plan   There are no diagnoses linked to this encounter.          HTN continue lisinopril 20-- controlled   decline flu shot.  hyperglycemia  elevated still   Hyperlipidemia  -- failed atorvastatin, try crestor  overweight diet and exercise -- weight down 6 lbs  libido ED declined cialis daily  Hypogonadism patient is taking testo supplement over the counter, encourage patient to d/c and  androgel, increase to 40.5.-- not taking for 3 weeks due to insurance, will try compound testo gel  vitD low continue vitD3 1000u daily changed to qod -- not taking, Vit D low normal  Onyco s/p lamisil for 3 mo now liver enzymes elevated.failed.  Watch for now.  colonoscopy, refuse now again 2/2018 explained risk of cancer for early detection. Do cologuard -- never received cologuard, resend      6 weeks after labs

## 2019-03-08 NOTE — PROGRESS NOTES
Zofia Lorenzo is a 53 y.o. male and is here for a comprehensive physical exam.     Do you take any herbs or supplements that were not prescribed by a doctor? no  Are you taking calcium supplements? no  Are you taking aspirin daily? no      The following portions of the patient's history were reviewed and updated as appropriate: allergies, current medications, past family history, past medical history, past social history, past surgical history and problem list.      Review of Systems   Constitutional: Negative.    HENT: Negative.    Eyes: Negative.    Respiratory: Negative.    Cardiovascular: Negative.    Gastrointestinal: Negative.    Endocrine: Negative.    Genitourinary: Negative.    Musculoskeletal: Negative.    Skin: Negative.    Allergic/Immunologic: Negative.    Neurological: Negative.    Hematological: Negative.    Psychiatric/Behavioral: Negative.    All other systems reviewed and are negative.        Physical Exam   Constitutional: He is oriented to person, place, and time. He appears well-developed and well-nourished.   HENT:   Head: Normocephalic and atraumatic.   Right Ear: External ear normal.   Left Ear: External ear normal.   Nose: Nose normal.   Mouth/Throat: Oropharynx is clear and moist.   Eyes: Conjunctivae and EOM are normal. Pupils are equal, round, and reactive to light.   Neck: Normal range of motion. Neck supple. No thyromegaly present.   Cardiovascular: Normal rate, regular rhythm, normal heart sounds and intact distal pulses.   Pulmonary/Chest: Effort normal and breath sounds normal.   Abdominal: Soft. Bowel sounds are normal.   Genitourinary: Rectum normal, prostate normal and penis normal.   Musculoskeletal: Normal range of motion.   Neurological: He is alert and oriented to person, place, and time. He has normal reflexes.   Skin: Skin is warm and dry.   Psychiatric: He has a normal mood and affect. His behavior is normal. Judgment and thought content normal.   Nursing note  and vitals reviewed.      All  tests have been reviewed.    Assessment/Plan          1. Patient Counseling:  --Nutrition: Stressed importance of moderation in sodium/caffeine intake, saturated fat and cholesterol, caloric balance, sufficient intake of fresh fruits, vegetables, fiber, calcium and iron.  --Exercise: Stressed the importance of regular exercise.   --Injury prevention: Discussed safety belts, safety helmets, smoke detector, smoking near bedding or upholstery.   --Dental health: Discussed importance of regular tooth brushing, flossing, and dental visits.  --Immunizations reviewed.  --Discussed benefits of screening colonoscopy.  --After hours service discussed with patient    2. Discussed the patient's BMI with him.            HTN continue lisinopril 20-- controlled   decline flu shot.  hyperglycemia  elevated still   Hyperlipidemia  -- failed atorvastatin, try crestor  overweight diet and exercise -- weight down 6 lbs  libido ED declined cialis daily  Hypogonadism patient is taking testo supplement over the counter, encourage patient to d/c and  androgel, increase to 40.5.-- not taking for 3 weeks due to insurance, will try compound testo gel  vitD low continue vitD3 1000u daily changed to qod -- not taking, Vit D low normal  Onyco s/p lamisil for 3 mo now liver enzymes elevated.failed.  Watch for now.  colonoscopy, refuse now again 2/2018 explained risk of cancer for early detection. Do cologuard -- never received cologuard, resend      6 weeks after labs

## 2019-03-21 DIAGNOSIS — E78.5 HYPERLIPIDEMIA, UNSPECIFIED HYPERLIPIDEMIA TYPE: Primary | ICD-10-CM

## 2019-03-21 RX ORDER — ROSUVASTATIN CALCIUM 10 MG/1
10 TABLET, COATED ORAL DAILY
Qty: 30 TABLET | Refills: 1 | Status: SHIPPED | OUTPATIENT
Start: 2019-03-21 | End: 2019-04-14 | Stop reason: SDUPTHER

## 2019-04-15 RX ORDER — ROSUVASTATIN CALCIUM 10 MG/1
10 TABLET, COATED ORAL DAILY
Qty: 90 TABLET | Refills: 3 | Status: SHIPPED | OUTPATIENT
Start: 2019-04-15 | End: 2020-07-02

## 2019-04-15 RX ORDER — LISINOPRIL 20 MG/1
20 TABLET ORAL DAILY
Qty: 90 TABLET | Refills: 3 | Status: SHIPPED | OUTPATIENT
Start: 2019-04-15 | End: 2021-03-19 | Stop reason: SDUPTHER

## 2019-04-22 DIAGNOSIS — E29.1 HYPOGONADISM IN MALE: ICD-10-CM

## 2019-04-22 DIAGNOSIS — E78.5 HYPERLIPIDEMIA, UNSPECIFIED HYPERLIPIDEMIA TYPE: Primary | ICD-10-CM

## 2019-04-22 RX ORDER — LISINOPRIL 20 MG/1
20 TABLET ORAL DAILY
Qty: 90 TABLET | Refills: 3 | Status: SHIPPED | OUTPATIENT
Start: 2019-04-22 | End: 2020-04-17 | Stop reason: SDUPTHER

## 2019-04-27 LAB
ALBUMIN SERPL-MCNC: 4.3 G/DL (ref 3.5–5)
ALBUMIN/GLOB SERPL: 1.6 G/DL (ref 1–2)
ALP SERPL-CCNC: 82 U/L (ref 38–126)
ALT SERPL-CCNC: 50 U/L (ref 13–69)
AST SERPL-CCNC: 37 U/L (ref 15–46)
BILIRUB SERPL-MCNC: 0.3 MG/DL (ref 0.2–1.3)
BUN SERPL-MCNC: 21 MG/DL (ref 7–20)
BUN/CREAT SERPL: 21 (ref 6.3–21.9)
CALCIUM SERPL-MCNC: 9 MG/DL (ref 8.4–10.2)
CHLORIDE SERPL-SCNC: 103 MMOL/L (ref 98–107)
CHOLEST SERPL-MCNC: 172 MG/DL (ref 0–199)
CK SERPL-CCNC: 86 U/L (ref 30–170)
CO2 SERPL-SCNC: 24 MMOL/L (ref 26–30)
CREAT SERPL-MCNC: 1 MG/DL (ref 0.6–1.3)
GLOBULIN SER CALC-MCNC: 2.7 GM/DL
GLUCOSE SERPL-MCNC: 106 MG/DL (ref 74–98)
HDLC SERPL-MCNC: 23 MG/DL (ref 40–60)
LDLC SERPL CALC-MCNC: 92 MG/DL (ref 0–99)
POTASSIUM SERPL-SCNC: 4.6 MMOL/L (ref 3.5–5.1)
PROT SERPL-MCNC: 7 G/DL (ref 6.3–8.2)
SODIUM SERPL-SCNC: 139 MMOL/L (ref 137–145)
TESTOST SERPL-MCNC: 347 NG/DL (ref 264–916)
TRIGL SERPL-MCNC: 287 MG/DL
VLDLC SERPL CALC-MCNC: 57.4 MG/DL

## 2020-04-17 RX ORDER — LISINOPRIL 20 MG/1
20 TABLET ORAL DAILY
Qty: 90 TABLET | Refills: 3 | Status: SHIPPED | OUTPATIENT
Start: 2020-04-17 | End: 2020-07-02

## 2020-07-02 ENCOUNTER — OFFICE VISIT (OUTPATIENT)
Dept: INTERNAL MEDICINE | Facility: CLINIC | Age: 55
End: 2020-07-02

## 2020-07-02 VITALS
SYSTOLIC BLOOD PRESSURE: 122 MMHG | BODY MASS INDEX: 30.64 KG/M2 | WEIGHT: 214 LBS | DIASTOLIC BLOOD PRESSURE: 80 MMHG | RESPIRATION RATE: 16 BRPM | OXYGEN SATURATION: 96 % | HEIGHT: 70 IN | HEART RATE: 78 BPM | TEMPERATURE: 98.2 F

## 2020-07-02 DIAGNOSIS — L72.9 SUBCUTANEOUS CYST: Primary | ICD-10-CM

## 2020-07-02 DIAGNOSIS — Z00.00 ANNUAL PHYSICAL EXAM: ICD-10-CM

## 2020-07-02 PROCEDURE — 99213 OFFICE O/P EST LOW 20 MIN: CPT | Performed by: INTERNAL MEDICINE

## 2020-07-02 NOTE — PROGRESS NOTES
Zofia Lorenzo is a 54 y.o. male.     Chief Complaint   Patient presents with   • Mass     Pt states that he has a lump on right mid shoulder blade, pt states it does not bother him but his wife noticied it.        History of Present Illness   Patient complaints right upper shoulder blade mass discovered recently.  Patient is  worry about it.  Denies any pain tenderness.  No injury.  No history of a similar episode.    Current Outpatient Medications:   •  lisinopril (PRINIVIL,ZESTRIL) 20 MG tablet, Take 1 tablet by mouth Daily., Disp: 90 tablet, Rfl: 3    The following portions of the patient's history were reviewed and updated as appropriate: allergies, current medications, past family history, past medical history, past social history, past surgical history and problem list.    Review of Systems   Constitutional: Negative.    Respiratory: Negative.    Cardiovascular: Negative.    Gastrointestinal: Negative.    Skin: Negative.         Right shoulder blade mass   Psychiatric/Behavioral: Negative.        Objective   Physical Exam   Constitutional: He is oriented to person, place, and time. He appears well-developed and well-nourished.   Neck: Neck supple.   Cardiovascular: Normal rate, regular rhythm and normal heart sounds.   Pulmonary/Chest: Effort normal and breath sounds normal.   Abdominal: Soft. Bowel sounds are normal.   Neurological: He is alert and oriented to person, place, and time.   Skin:   Right shoulder blade mass nickel size subcutaneous in nature clear border.  No tenderness   Psychiatric: He has a normal mood and affect. His behavior is normal.       All tests have been reviewed.    Assessment/Plan   Diagnoses and all orders for this visit:    Subcutaneous cyst probable sebaceous cyst versus fatty tumor.  Continue to watch if it increases in size patient knows to call    Patient going to schedule physical and to do blood tests before physical    Annual physical exam  -     CBC &  Differential  -     Comprehensive Metabolic Panel  -     Lipid Panel  -     TSH  -     PSA DIAGNOSTIC  -     Hemoglobin A1c  -     Vitamin D 25 Hydroxy

## 2021-03-05 ENCOUNTER — TELEPHONE (OUTPATIENT)
Dept: INTERNAL MEDICINE | Facility: CLINIC | Age: 56
End: 2021-03-05

## 2021-03-05 NOTE — TELEPHONE ENCOUNTER
PATIENTS WIFE DELORES CALLED TO SEE IF THE PATIENT CAN CHANGE HIS PRIMARY CARE DOCTOR TO DOCTOR BUCHANAN. THE PATIENTS WIFE STATES THAT SHE IS A PATIENT OF DOCTOR DEWAYNE AND SHE WOULD LIKE HER  TO SEE THE SAME DOCTOR. PATIENTS WIFE STATES THAT THE PATIENT IS NOT HAPPY WITH HIS CURRENT PRIMARY CARE PROVIDER. PLEASE CALL PATIENT OR PATIENTS WIFE TO LET THEM KNOW IF THE PATIENT CAN CHANGE PRIMARY CARE DOCTORS.      CALL [phone]

## 2021-03-19 ENCOUNTER — OFFICE VISIT (OUTPATIENT)
Dept: INTERNAL MEDICINE | Facility: CLINIC | Age: 56
End: 2021-03-19

## 2021-03-19 VITALS
OXYGEN SATURATION: 99 % | SYSTOLIC BLOOD PRESSURE: 102 MMHG | RESPIRATION RATE: 16 BRPM | TEMPERATURE: 97.7 F | DIASTOLIC BLOOD PRESSURE: 70 MMHG | BODY MASS INDEX: 31.07 KG/M2 | WEIGHT: 217 LBS | HEART RATE: 50 BPM | HEIGHT: 70 IN

## 2021-03-19 DIAGNOSIS — G47.33 OSA (OBSTRUCTIVE SLEEP APNEA): ICD-10-CM

## 2021-03-19 DIAGNOSIS — I10 ESSENTIAL HYPERTENSION: Primary | ICD-10-CM

## 2021-03-19 DIAGNOSIS — Z12.5 PROSTATE CANCER SCREENING: ICD-10-CM

## 2021-03-19 DIAGNOSIS — Z00.00 ROUTINE GENERAL MEDICAL EXAMINATION AT A HEALTH CARE FACILITY: ICD-10-CM

## 2021-03-19 DIAGNOSIS — R41.3 SHORT-TERM MEMORY LOSS: ICD-10-CM

## 2021-03-19 PROBLEM — E66.3 OVERWEIGHT: Status: RESOLVED | Noted: 2017-02-14 | Resolved: 2021-03-19

## 2021-03-19 PROBLEM — R73.9 HYPERGLYCEMIA: Status: RESOLVED | Noted: 2017-02-14 | Resolved: 2021-03-19

## 2021-03-19 PROBLEM — N52.9 ED (ERECTILE DYSFUNCTION): Status: RESOLVED | Noted: 2017-02-14 | Resolved: 2021-03-19

## 2021-03-19 PROBLEM — E55.9 VITAMIN D DEFICIENCY DISEASE: Status: RESOLVED | Noted: 2018-02-23 | Resolved: 2021-03-19

## 2021-03-19 PROBLEM — E78.5 HYPERLIPIDEMIA: Status: RESOLVED | Noted: 2017-02-14 | Resolved: 2021-03-19

## 2021-03-19 PROCEDURE — 99396 PREV VISIT EST AGE 40-64: CPT | Performed by: INTERNAL MEDICINE

## 2021-03-19 RX ORDER — LISINOPRIL 20 MG/1
20 TABLET ORAL DAILY
Qty: 90 TABLET | Refills: 3 | Status: SHIPPED | OUTPATIENT
Start: 2021-03-19 | End: 2021-08-20 | Stop reason: SDUPTHER

## 2021-03-19 NOTE — PROGRESS NOTES
"Subjective     Patient ID: Moreno Lorenzo is a 55 y.o. male. Patient is here for management of multiple medical problems.     Chief Complaint   Patient presents with   • Hypertension     History of Present Illness     hyperten      Needs PE.   Low T.  Tried gel in the past.  No help.    Dizziness.        The following portions of the patient's history were reviewed and updated as appropriate: allergies, current medications, past family history, past medical history, past social history, past surgical history and problem list.    Review of Systems   Constitutional: Positive for fatigue.   Neurological: Positive for dizziness.   Psychiatric/Behavioral: Negative for self-injury and sleep disturbance.       Current Outpatient Medications:   •  lisinopril (PRINIVIL,ZESTRIL) 20 MG tablet, Take 1 tablet by mouth Daily., Disp: 90 tablet, Rfl: 3    Objective      Blood pressure 102/70, pulse 50, temperature 97.7 °F (36.5 °C), resp. rate 16, height 177.8 cm (70\"), weight 98.4 kg (217 lb), SpO2 99 %.    Physical Exam     General Appearance:    Alert, cooperative, no distress, appears stated age   Head:    Normocephalic, without obvious abnormality, atraumatic   Eyes:    PERRL, conjunctiva/corneas clear, EOM's intact   Ears:    Normal TM's and external ear canals, both ears   Nose:   Nares normal, septum midline, mucosa normal, no drainage   or sinus tenderness   Throat:   Lips, mucosa, and tongue normal; teeth and gums normal   Neck:   Supple, symmetrical, trachea midline, no adenopathy;        thyroid:  No enlargement/tenderness/nodules; no carotid    bruit or JVD   Back:     Symmetric, no curvature, ROM normal, no CVA tenderness   Lungs:     Clear to auscultation bilaterally, respirations unlabored   Chest wall:    No tenderness or deformity   Heart:    Regular rate and rhythm, S1 and S2 normal, no murmur,        rub or gallop   Abdomen:     Soft, non-tender, bowel sounds active all four quadrants,     no masses, no " organomegaly   Extremities:   Extremities normal, atraumatic, no cyanosis or edema   Pulses:   2+ and symmetric all extremities   Skin:   Skin color, texture, turgor normal, no rashes or lesions   Lymph nodes:   Cervical, supraclavicular, and axillary nodes normal   Neurologic:   CNII-XII intact. Normal strength, sensation and reflexes       throughout      Results for orders placed or performed in visit on 04/22/19   CK    Specimen: Blood   Result Value Ref Range    Creatine Kinase 86 30 - 170 U/L   Comprehensive Metabolic Panel    Specimen: Blood   Result Value Ref Range    Glucose 106 (H) 74 - 98 mg/dL    BUN 21 (H) 7 - 20 mg/dL    Creatinine 1.00 0.60 - 1.30 mg/dL    eGFR Non African Am 78 >60 mL/min/1.73    eGFR African Am 94 >60 mL/min/1.73    BUN/Creatinine Ratio 21.0 6.3 - 21.9    Sodium 139 137 - 145 mmol/L    Potassium 4.6 3.5 - 5.1 mmol/L    Chloride 103 98 - 107 mmol/L    Total CO2 24.0 (L) 26.0 - 30.0 mmol/L    Calcium 9.0 8.4 - 10.2 mg/dL    Total Protein 7.0 6.3 - 8.2 g/dL    Albumin 4.30 3.50 - 5.00 g/dL    Globulin 2.7 gm/dL    A/G Ratio 1.6 1.0 - 2.0 g/dL    Total Bilirubin 0.3 0.2 - 1.3 mg/dL    Alkaline Phosphatase 82 38 - 126 U/L    AST (SGOT) 37 15 - 46 U/L    ALT (SGPT) 50 13 - 69 U/L   Lipid Panel    Specimen: Blood   Result Value Ref Range    Total Cholesterol 172 0 - 199 mg/dL    Triglycerides 287 (H) <150 mg/dL    HDL Cholesterol 23 (L) 40 - 60 mg/dL    VLDL Cholesterol 57.4 mg/dL    LDL Cholesterol  92 0 - 99 mg/dL   Testosterone    Specimen: Blood   Result Value Ref Range    Testosterone, Total 347 264 - 916 ng/dL         Assessment/Plan   Diet and exercise discussed.    Wearing seat belts.        Diagnoses and all orders for this visit:    1. Essential hypertension (Primary)  -     PSA Screen  -     Lipid Panel  -     CBC & Differential  -     Vitamin B12  -     Comprehensive Metabolic Panel  -     TSH  -     T4, Free  -     Vitamin B6  -     Estrogens, Total  -     Testosterone (Free &  Total), LC / MS  -     Hemoglobin A1c    2. Prostate cancer screening  -     PSA Screen  -     Lipid Panel  -     CBC & Differential  -     Vitamin B12  -     Comprehensive Metabolic Panel  -     TSH  -     T4, Free  -     Vitamin B6  -     Estrogens, Total  -     Testosterone (Free & Total), LC / MS  -     Hemoglobin A1c    3. Routine general medical examination at a health care facility  -     PSA Screen  -     Lipid Panel  -     CBC & Differential  -     Vitamin B12  -     Comprehensive Metabolic Panel  -     TSH  -     T4, Free  -     Vitamin B6  -     Estrogens, Total  -     Testosterone (Free & Total), LC / MS  -     Hemoglobin A1c    4. STEVEN (obstructive sleep apnea)  -     Ambulatory Referral to Neurology    5. Short-term memory loss  -     [place] Spotted Fever, IgM  -     [facility] Spotted Fever, IgG    Other orders  -     lisinopril (PRINIVIL,ZESTRIL) 20 MG tablet; Take 1 tablet by mouth Daily.  Dispense: 90 tablet; Refill: 3      Return in about 6 weeks (around 4/30/2021).          There are no Patient Instructions on file for this visit.     Dominick Vizcarra MD    Assessment/Plan

## 2021-03-23 LAB
R RICKETTSI IGG SER QL IA: POSITIVE
R RICKETTSI IGG TITR SER IF: ABNORMAL {TITER}
R RICKETTSI IGM SER-ACNC: 0.3 INDEX (ref 0–0.89)

## 2021-03-24 NOTE — PROGRESS NOTES
Please let them know the RMSF IgG elevated. Will need to rtc sooner if wants to work thru some of this.  I don't feel any of this is urgent.  If tired with memory loss is significant enough in his live move the apt to sooner.

## 2021-03-26 ENCOUNTER — IMMUNIZATION (OUTPATIENT)
Dept: VACCINE CLINIC | Facility: HOSPITAL | Age: 56
End: 2021-03-26

## 2021-03-26 LAB
ALBUMIN SERPL-MCNC: 4.7 G/DL (ref 3.5–5.2)
ALBUMIN/GLOB SERPL: 2 G/DL
ALP SERPL-CCNC: 97 U/L (ref 39–117)
ALT SERPL-CCNC: 25 U/L (ref 1–41)
AST SERPL-CCNC: 21 U/L (ref 1–40)
BASOPHILS # BLD AUTO: 0.04 10*3/MM3 (ref 0–0.2)
BASOPHILS NFR BLD AUTO: 0.7 % (ref 0–1.5)
BILIRUB SERPL-MCNC: 0.5 MG/DL (ref 0–1.2)
BUN SERPL-MCNC: 17 MG/DL (ref 6–20)
BUN/CREAT SERPL: 18.1 (ref 7–25)
CALCIUM SERPL-MCNC: 8.9 MG/DL (ref 8.6–10.5)
CHLORIDE SERPL-SCNC: 102 MMOL/L (ref 98–107)
CHOLEST SERPL-MCNC: 240 MG/DL (ref 0–200)
CO2 SERPL-SCNC: 27.3 MMOL/L (ref 22–29)
CREAT SERPL-MCNC: 0.94 MG/DL (ref 0.76–1.27)
EOSINOPHIL # BLD AUTO: 0.13 10*3/MM3 (ref 0–0.4)
EOSINOPHIL NFR BLD AUTO: 2.2 % (ref 0.3–6.2)
ERYTHROCYTE [DISTWIDTH] IN BLOOD BY AUTOMATED COUNT: 12.9 % (ref 12.3–15.4)
ESTROGEN SERPL-MCNC: 136 PG/ML (ref 40–115)
GLOBULIN SER CALC-MCNC: 2.4 GM/DL
GLUCOSE SERPL-MCNC: 94 MG/DL (ref 65–99)
HBA1C MFR BLD: 5.7 % (ref 4.8–5.6)
HCT VFR BLD AUTO: 46 % (ref 37.5–51)
HDLC SERPL-MCNC: 27 MG/DL (ref 40–60)
HGB BLD-MCNC: 15.8 G/DL (ref 13–17.7)
IMM GRANULOCYTES # BLD AUTO: 0.01 10*3/MM3 (ref 0–0.05)
IMM GRANULOCYTES NFR BLD AUTO: 0.2 % (ref 0–0.5)
LDLC SERPL CALC-MCNC: 176 MG/DL (ref 0–100)
LYMPHOCYTES # BLD AUTO: 1.97 10*3/MM3 (ref 0.7–3.1)
LYMPHOCYTES NFR BLD AUTO: 33.5 % (ref 19.6–45.3)
MCH RBC QN AUTO: 30.6 PG (ref 26.6–33)
MCHC RBC AUTO-ENTMCNC: 34.3 G/DL (ref 31.5–35.7)
MCV RBC AUTO: 89 FL (ref 79–97)
MONOCYTES # BLD AUTO: 0.42 10*3/MM3 (ref 0.1–0.9)
MONOCYTES NFR BLD AUTO: 7.1 % (ref 5–12)
NEUTROPHILS # BLD AUTO: 3.31 10*3/MM3 (ref 1.7–7)
NEUTROPHILS NFR BLD AUTO: 56.3 % (ref 42.7–76)
NRBC BLD AUTO-RTO: 0 /100 WBC (ref 0–0.2)
PLATELET # BLD AUTO: 183 10*3/MM3 (ref 140–450)
POTASSIUM SERPL-SCNC: 4.3 MMOL/L (ref 3.5–5.2)
PROT SERPL-MCNC: 7.1 G/DL (ref 6–8.5)
PSA SERPL-MCNC: 0.39 NG/ML (ref 0–4)
RBC # BLD AUTO: 5.17 10*6/MM3 (ref 4.14–5.8)
SODIUM SERPL-SCNC: 137 MMOL/L (ref 136–145)
T4 FREE SERPL-MCNC: 1.3 NG/DL (ref 0.93–1.7)
TESTOST FREE SERPL-MCNC: 6.9 PG/ML (ref 7.2–24)
TESTOST SERPL-MCNC: 272.5 NG/DL (ref 264–916)
TRIGL SERPL-MCNC: 196 MG/DL (ref 0–150)
TSH SERPL DL<=0.005 MIU/L-ACNC: 2.01 UIU/ML (ref 0.27–4.2)
VIT B12 SERPL-MCNC: 622 PG/ML (ref 211–946)
VIT B6 SERPL-MCNC: 75.9 UG/L (ref 5.3–46.7)
VLDLC SERPL CALC-MCNC: 37 MG/DL (ref 5–40)
WBC # BLD AUTO: 5.88 10*3/MM3 (ref 3.4–10.8)

## 2021-03-26 PROCEDURE — 91300 HC SARSCOV02 VAC 30MCG/0.3ML IM: CPT | Performed by: INTERNAL MEDICINE

## 2021-03-26 PROCEDURE — 0001A: CPT | Performed by: INTERNAL MEDICINE

## 2021-03-26 NOTE — PROGRESS NOTES
Please let them know the the vit b6 is to high this will cause numbness and tingling in hand an feet. Please stop any supplements.

## 2021-04-16 ENCOUNTER — IMMUNIZATION (OUTPATIENT)
Dept: VACCINE CLINIC | Facility: HOSPITAL | Age: 56
End: 2021-04-16

## 2021-04-16 PROCEDURE — 0002A: CPT | Performed by: INTERNAL MEDICINE

## 2021-04-16 PROCEDURE — 91300 HC SARSCOV02 VAC 30MCG/0.3ML IM: CPT | Performed by: INTERNAL MEDICINE

## 2021-05-04 ENCOUNTER — OFFICE VISIT (OUTPATIENT)
Dept: NEUROLOGY | Facility: CLINIC | Age: 56
End: 2021-05-04

## 2021-05-04 VITALS
HEART RATE: 53 BPM | DIASTOLIC BLOOD PRESSURE: 80 MMHG | HEIGHT: 70 IN | BODY MASS INDEX: 31.07 KG/M2 | WEIGHT: 217 LBS | SYSTOLIC BLOOD PRESSURE: 110 MMHG | TEMPERATURE: 97.7 F | OXYGEN SATURATION: 94 %

## 2021-05-04 DIAGNOSIS — I10 ESSENTIAL HYPERTENSION: ICD-10-CM

## 2021-05-04 DIAGNOSIS — R06.83 SNORING: Primary | ICD-10-CM

## 2021-05-04 DIAGNOSIS — E78.5 HYPERLIPIDEMIA, UNSPECIFIED HYPERLIPIDEMIA TYPE: ICD-10-CM

## 2021-05-04 PROCEDURE — 99203 OFFICE O/P NEW LOW 30 MIN: CPT | Performed by: NURSE PRACTITIONER

## 2021-05-04 NOTE — PROGRESS NOTES
New Sleep Patient Office Visit      Patient Name: Moreno Lorenzo  : 1965   MRN: [id number]     Referring Physician: Dominick Vizcarra MD    Chief Complaint:    Chief Complaint   Patient presents with   • Consult     NP, in office to establish care for kim. patient c/o dry mouth, restless sleep.       History of Present Illness: Moreno Lorenzo is a 55 y.o. male who is here today to establish care with Sleep Medicine.  He says he had a sleep study about 5 years ago and it was negative-with Dr. Bishop.  Sleep questionnaire reviewed.  He denies having any problems with excessive daytime sleepiness, he does experience restless or disturbed sleep, it takes him 5 minutes to fall asleep at night, he does not wake up during sleep, he feels good upon awakening, he sleeps 6-10 hours per night, he has been told he snores, he wakes up with a dry mouth, he experiences memory loss/decreased concentration/decreased libido, he some times experiences leg kicking/movements while asleep, he has low energy during the day.  Additional risk factors- BMI 31, HL, HTN.     [place] Score: 1    Subjective      Review of Systems:   Review of Systems   Constitutional: Negative for fatigue, fever, unexpected weight gain and unexpected weight loss.   HENT: Negative for hearing loss, sore throat, swollen glands, tinnitus and trouble swallowing.    Eyes: Negative for blurred vision, double vision, photophobia and visual disturbance.   Respiratory: Negative for cough, chest tightness and shortness of breath.    Cardiovascular: Negative for chest pain, palpitations and leg swelling.   Gastrointestinal: Negative for constipation, diarrhea and nausea.   Endocrine: Negative for cold intolerance and heat intolerance.   Musculoskeletal: Negative for gait problem, neck pain and neck stiffness.   Skin: Negative for color change and rash.   Allergic/Immunologic: Negative for environmental allergies and food allergies.   Neurological: Negative for  "dizziness, syncope, facial asymmetry, speech difficulty, weakness, headache, memory problem and confusion.   Psychiatric/Behavioral: Negative for agitation, behavioral problems and depressed mood. The patient is not nervous/anxious.        Past Medical History:   Past Medical History:   Diagnosis Date   • Abnormal liver enzymes    • Hyperlipidemia    • Hypertension        Past Surgical History:   Past Surgical History:   Procedure Laterality Date   • VASECTOMY         Family History:   Family History   Problem Relation Age of Onset   • Hypertension Father    • Hyperlipidemia Father    • Stroke Father        Social History:   Social History     Socioeconomic History   • Marital status:      Spouse name: Not on file   • Number of children: Not on file   • Years of education: Not on file   • Highest education level: Not on file   Tobacco Use   • Smoking status: Never Smoker   • Smokeless tobacco: Never Used   Substance and Sexual Activity   • Alcohol use: No   • Drug use: Defer   • Sexual activity: Defer       Medications:     Current Outpatient Medications:   •  lisinopril (PRINIVIL,ZESTRIL) 20 MG tablet, Take 1 tablet by mouth Daily., Disp: 90 tablet, Rfl: 3    Allergies:   No Known Allergies    Objective     Physical Exam:  Vital Signs:   Vitals:    05/04/21 1059   BP: 110/80   BP Location: Left arm   Patient Position: Sitting   Cuff Size: Adult   Pulse: 53   Temp: 97.7 °F (36.5 °C)   SpO2: 94%   Weight: 98.4 kg (217 lb)   Height: 177.8 cm (70\")   PainSc: 0-No pain     BMI: Body mass index is 31.14 kg/m².  Neck Circumference: 16in               Physical Exam  Vitals and nursing note reviewed.   Constitutional:       General: He is not in acute distress.     Appearance: Normal appearance. He is well-developed. He is not diaphoretic.   HENT:      Head: Normocephalic and atraumatic.      Comments: Mallampati 4  Eyes:      Conjunctiva/sclera: Conjunctivae normal.      Pupils: Pupils are equal, round, and reactive " to light.   Neck:      Thyroid: No thyroid mass or thyromegaly.      Vascular: Normal carotid pulses.      Trachea: Trachea normal.   Cardiovascular:      Rate and Rhythm: Normal rate and regular rhythm.      Heart sounds: Normal heart sounds. No murmur heard.   No friction rub. No gallop.    Pulmonary:      Effort: Pulmonary effort is normal. No respiratory distress.      Breath sounds: Normal breath sounds. No wheezing or rales.   Musculoskeletal:         General: Normal range of motion.   Skin:     General: Skin is warm and dry.      Findings: No rash.   Neurological:      Mental Status: He is alert and oriented to person, place, and time.   Psychiatric:         Mood and Affect: Mood normal.         Behavior: Behavior normal.         Thought Content: Thought content normal.         Judgment: Judgment normal.         Assessment / Plan      Assessment/Plan:   Diagnoses and all orders for this visit:    1. Essential hypertension (Primary)  -     Polysomnography 4 or More Parameters; Future  - Education on STEVEN provided.   - Advised patient to avoid driving if drowsy.     2. Hyperlipidemia, unspecified hyperlipidemia type  -     Polysomnography 4 or More Parameters; Future    3. BMI 31.0-31.9,adult  -     Polysomnography 4 or More Parameters; Future    4. Snoring  -     Polysomnography 4 or More Parameters; Future       Follow Up:   Return in about 3 months (around 8/4/2021) for F/U Obstructive Sleep Apnea.    I have advised the patient the need to continue the use of CPAP.  Gold standard for treatment of sleep apnea includes weight loss, use of cpap and avoidance of alcohol.  Untreated TSEVEN may increase the risk for development of hypertension, stroke, myocardial infarction, diabetes, cardiovascular disease, work-related issues and driving accidents. I have counseled and advised the patient to avoid driving or operating heavy/dangerous equipment if feeling drowsy.     CALDERON Houser, FNP-C  [facility]       Please note that portions of this note may have been completed with a voice recognition program. Efforts were made to edit the dictations, but occasionally words are mistranscribed.

## 2021-05-04 NOTE — PATIENT INSTRUCTIONS
Sleep Apnea  Sleep apnea affects breathing during sleep. It causes breathing to stop for a short time or to become shallow. It can also increase the risk of:  · Heart attack.  · Stroke.  · Being very overweight (obese).  · Diabetes.  · Heart failure.  · Irregular heartbeat.  The goal of treatment is to help you breathe normally again.  What are the causes?  There are three kinds of sleep apnea:  · Obstructive sleep apnea. This is caused by a blocked or collapsed airway.  · Central sleep apnea. This happens when the brain does not send the right signals to the muscles that control breathing.  · Mixed sleep apnea. This is a combination of obstructive and central sleep apnea.  The most common cause of this condition is a collapsed or blocked airway. This can happen if:  · Your throat muscles are too relaxed.  · Your tongue and tonsils are too large.  · You are overweight.  · Your airway is too small.  What increases the risk?  · Being overweight.  · Smoking.  · Having a small airway.  · Being older.  · Being male.  · Drinking alcohol.  · Taking medicines to calm yourself (sedatives or tranquilizers).  · Having family members with the condition.  What are the signs or symptoms?  · Trouble staying asleep.  · Being sleepy or tired during the day.  · Getting angry a lot.  · Loud snoring.  · Headaches in the morning.  · Not being able to focus your mind (concentrate).  · Forgetting things.  · Less interest in sex.  · Mood swings.  · Personality changes.  · Feelings of sadness (depression).  · Waking up a lot during the night to pee (urinate).  · Dry mouth.  · Sore throat.  How is this diagnosed?  · Your medical history.  · A physical exam.  · A test that is done when you are sleeping (sleep study). The test is most often done in a sleep lab but may also be done at home.  How is this treated?    · Sleeping on your side.  · Using a medicine to get rid of mucus in your nose (decongestant).  · Avoiding the use of alcohol,  medicines to help you relax, or certain pain medicines (narcotics).  · Losing weight, if needed.  · Changing your diet.  · Not smoking.  · Using a machine to open your airway while you sleep, such as:  ? An oral appliance. This is a mouthpiece that shifts your lower jaw forward.  ? A CPAP device. This device blows air through a mask when you breathe out (exhale).  ? An EPAP device. This has valves that you put in each nostril.  ? A BPAP device. This device blows air through a mask when you breathe in (inhale) and breathe out.  · Having surgery if other treatments do not work.  It is important to get treatment for sleep apnea. Without treatment, it can lead to:  · High blood pressure.  · Coronary artery disease.  · In men, not being able to have an erection (impotence).  · Reduced thinking ability.  Follow these instructions at home:  Lifestyle  · Make changes that your doctor recommends.  · Eat a healthy diet.  · Lose weight if needed.  · Avoid alcohol, medicines to help you relax, and some pain medicines.  · Do not use any products that contain nicotine or tobacco, such as cigarettes, e-cigarettes, and chewing tobacco. If you need help quitting, ask your doctor.  General instructions  · Take over-the-counter and prescription medicines only as told by your doctor.  · If you were given a machine to use while you sleep, use it only as told by your doctor.  · If you are having surgery, make sure to tell your doctor you have sleep apnea. You may need to bring your device with you.  · Keep all follow-up visits as told by your doctor. This is important.  Contact a doctor if:  · The machine that you were given to use during sleep bothers you or does not seem to be working.  · You do not get better.  · You get worse.  Get help right away if:  · Your chest hurts.  · You have trouble breathing in enough air.  · You have an uncomfortable feeling in your back, arms, or stomach.  · You have trouble talking.  · One side of your  body feels weak.  · A part of your face is hanging down.  These symptoms may be an emergency. Do not wait to see if the symptoms will go away. Get medical help right away. Call your local emergency services (911 in the [place] Do not drive yourself to the hospital.  Summary  · This condition affects breathing during sleep.  · The most common cause is a collapsed or blocked airway.  · The goal of treatment is to help you breathe normally while you sleep.  This information is not intended to replace advice given to you by your health care provider. Make sure you discuss any questions you have with your health care provider.  Document Revised: 10/04/2019 Document Reviewed: 08/13/2019  [facility] Patient Education © 2021 [employer]

## 2021-05-07 ENCOUNTER — OFFICE VISIT (OUTPATIENT)
Dept: INTERNAL MEDICINE | Facility: CLINIC | Age: 56
End: 2021-05-07

## 2021-05-07 VITALS
WEIGHT: 217 LBS | TEMPERATURE: 98.4 F | SYSTOLIC BLOOD PRESSURE: 130 MMHG | OXYGEN SATURATION: 94 % | HEART RATE: 87 BPM | HEIGHT: 70 IN | BODY MASS INDEX: 31.07 KG/M2 | DIASTOLIC BLOOD PRESSURE: 84 MMHG | RESPIRATION RATE: 16 BRPM

## 2021-05-07 DIAGNOSIS — R41.3 MEMORY LOSS: ICD-10-CM

## 2021-05-07 DIAGNOSIS — R79.89 LOW TESTOSTERONE: ICD-10-CM

## 2021-05-07 DIAGNOSIS — A77.0 RMSF (ROCKY MOUNTAIN SPOTTED FEVER): ICD-10-CM

## 2021-05-07 DIAGNOSIS — E28.0 ESTROGEN INCREASED: Primary | ICD-10-CM

## 2021-05-07 PROCEDURE — 99214 OFFICE O/P EST MOD 30 MIN: CPT | Performed by: INTERNAL MEDICINE

## 2021-05-07 RX ORDER — DOXYCYCLINE 100 MG/1
100 CAPSULE ORAL EVERY 12 HOURS SCHEDULED
Qty: 28 CAPSULE | Refills: 0 | Status: SHIPPED | OUTPATIENT
Start: 2021-05-07 | End: 2021-08-10

## 2021-05-07 NOTE — PROGRESS NOTES
"Subjective  Answers for HPI/ROS submitted by the patient on 5/5/2021  What is the primary reason for your visit?: Physical        Patient ID: Moreno Lorenzo is a 55 y.o. male. Patient is here for management of multiple medical problems.     Chief Complaint   Patient presents with   • Hypertension     History of Present Illness     The following portions of the patient's history were reviewed and updated as appropriate: allergies, current medications, past family history, past medical history, past social history, past surgical history and problem list.    Review of Systems    Current Outpatient Medications:   •  lisinopril (PRINIVIL,ZESTRIL) 20 MG tablet, Take 1 tablet by mouth Daily., Disp: 90 tablet, Rfl: 3  •  doxycycline (MONODOX) 100 MG capsule, Take 1 capsule by mouth Every 12 (Twelve) Hours., Disp: 28 capsule, Rfl: 0    Objective      Blood pressure 130/84, pulse 87, temperature 98.4 °F (36.9 °C), resp. rate 16, height 177.8 cm (70\"), weight 98.4 kg (217 lb), SpO2 94 %.    Physical Exam     General Appearance:    Alert, cooperative, no distress, appears stated age   Head:    Normocephalic, without obvious abnormality, atraumatic   Eyes:    PERRL, conjunctiva/corneas clear, EOM's intact   Ears:    Normal TM's and external ear canals, both ears   Nose:   Nares normal, septum midline, mucosa normal, no drainage   or sinus tenderness   Throat:   Lips, mucosa, and tongue normal; teeth and gums normal   Neck:   Supple, symmetrical, trachea midline, no adenopathy;        thyroid:  No enlargement/tenderness/nodules; no carotid    bruit or JVD   Back:     Symmetric, no curvature, ROM normal, no CVA tenderness   Lungs:     Clear to auscultation bilaterally, respirations unlabored   Chest wall:    No tenderness or deformity   Heart:    Regular rate and rhythm, S1 and S2 normal, no murmur,        rub or gallop   Abdomen:     Soft, non-tender, bowel sounds active all four quadrants,     no masses, no organomegaly "   Extremities:   Extremities normal, atraumatic, no cyanosis or edema   Pulses:   2+ and symmetric all extremities   Skin:   Skin color, texture, turgor normal, no rashes or lesions   Lymph nodes:   Cervical, supraclavicular, and axillary nodes normal   Neurologic:   CNII-XII intact. Normal strength, sensation and reflexes       throughout      Results for orders placed or performed in visit on 03/19/21   PSA Screen    Specimen: Blood   Result Value Ref Range    PSA 0.393 0.000 - 4.000 ng/mL   Lipid Panel    Specimen: Blood   Result Value Ref Range    Total Cholesterol 240 (H) 0 - 200 mg/dL    Triglycerides 196 (H) 0 - 150 mg/dL    HDL Cholesterol 27 (L) 40 - 60 mg/dL    VLDL Cholesterol Bruce 37 5 - 40 mg/dL    LDL Chol Calc (NIH) 176 (H) 0 - 100 mg/dL   Vitamin B12    Specimen: Blood   Result Value Ref Range    Vitamin B-12 622 211 - 946 pg/mL   Comprehensive Metabolic Panel    Specimen: Blood   Result Value Ref Range    Glucose 94 65 - 99 mg/dL    BUN 17 6 - 20 mg/dL    Creatinine 0.94 0.76 - 1.27 mg/dL    eGFR Non African Am 83 >60 mL/min/1.73    eGFR African Am 100 >60 mL/min/1.73    BUN/Creatinine Ratio 18.1 7.0 - 25.0    Sodium 137 136 - 145 mmol/L    Potassium 4.3 3.5 - 5.2 mmol/L    Chloride 102 98 - 107 mmol/L    Total CO2 27.3 22.0 - 29.0 mmol/L    Calcium 8.9 8.6 - 10.5 mg/dL    Total Protein 7.1 6.0 - 8.5 g/dL    Albumin 4.70 3.50 - 5.20 g/dL    Globulin 2.4 gm/dL    A/G Ratio 2.0 g/dL    Total Bilirubin 0.5 0.0 - 1.2 mg/dL    Alkaline Phosphatase 97 39 - 117 U/L    AST (SGOT) 21 1 - 40 U/L    ALT (SGPT) 25 1 - 41 U/L   TSH    Specimen: Blood   Result Value Ref Range    TSH 2.010 0.270 - 4.200 uIU/mL   T4, Free    Specimen: Blood   Result Value Ref Range    Free T4 1.30 0.93 - 1.70 ng/dL   Vitamin B6    Specimen: Blood   Result Value Ref Range    Vitamin B6 75.9 (H) 5.3 - 46.7 ug/L   Estrogens, Total    Specimen: Blood   Result Value Ref Range    Estrogen 136 (H) 40 - 115 pg/mL   Testosterone (Free &  Total), LC / MS    Specimen: Blood   Result Value Ref Range    Testosterone, Total 272.5 264.0 - 916.0 ng/dL    Testosterone, Free 6.9 (L) 7.2 - 24.0 pg/mL   Hemoglobin A1c    Specimen: Blood   Result Value Ref Range    Hemoglobin A1C 5.70 (H) 4.80 - 5.60 %   [place] Spotted Fever, IgM    Specimen: Blood   Result Value Ref Range    RMSF IgM 0.30 0.00 - 0.89 index   [facility] Spotted Fever, IgG    Specimen: Blood   Result Value Ref Range    RMSF IgG Positive (A) Negative   CBC & Differential    Specimen: Blood   Result Value Ref Range    WBC 5.88 3.40 - 10.80 10*3/mm3    RBC 5.17 4.14 - 5.80 10*6/mm3    Hemoglobin 15.8 13.0 - 17.7 g/dL    Hematocrit 46.0 37.5 - 51.0 %    MCV 89.0 79.0 - 97.0 fL    MCH 30.6 26.6 - 33.0 pg    MCHC 34.3 31.5 - 35.7 g/dL    RDW 12.9 12.3 - 15.4 %    Platelets 183 140 - 450 10*3/mm3    Neutrophil Rel % 56.3 42.7 - 76.0 %    Lymphocyte Rel % 33.5 19.6 - 45.3 %    Monocyte Rel % 7.1 5.0 - 12.0 %    Eosinophil Rel % 2.2 0.3 - 6.2 %    Basophil Rel % 0.7 0.0 - 1.5 %    Neutrophils Absolute 3.31 1.70 - 7.00 10*3/mm3    Lymphocytes Absolute 1.97 0.70 - 3.10 10*3/mm3    Monocytes Absolute 0.42 0.10 - 0.90 10*3/mm3    Eosinophils Absolute 0.13 0.00 - 0.40 10*3/mm3    Basophils Absolute 0.04 0.00 - 0.20 10*3/mm3    Immature Granulocyte Rel % 0.2 0.0 - 0.5 %    Immature Grans Absolute 0.01 0.00 - 0.05 10*3/mm3    nRBC 0.0 0.0 - 0.2 /100 WBC   Reflexed RMSF, IgG, IFA   Result Value Ref Range    RMSF IgG 1:64 (H) Neg <1:64         Assessment/Plan       Pt will need to increase exercise.   And wt loss.    Will start trial of doxy for mental fogginess and + rmsf ab.  Pt on tract for kim study.           Diagnoses and all orders for this visit:    1. Estrogen increased (Primary)  -     Ambulatory Referral to Urology    2. Low testosterone  -     Ambulatory Referral to Urology    3. Memory loss  -     doxycycline (MONODOX) 100 MG capsule; Take 1 capsule by mouth Every 12 (Twelve) Hours.  Dispense:  28 capsule; Refill: 0    4. RMSF ([place] spotted fever)  -     doxycycline (MONODOX) 100 MG capsule; Take 1 capsule by mouth Every 12 (Twelve) Hours.  Dispense: 28 capsule; Refill: 0      Return in about 3 months (around 8/7/2021).          There are no Patient Instructions on file for this visit.     Dominick Vizcarra MD    Assessment/Plan

## 2021-05-24 ENCOUNTER — HOSPITAL ENCOUNTER (OUTPATIENT)
Dept: SLEEP MEDICINE | Facility: HOSPITAL | Age: 56
Discharge: HOME | End: 2021-05-24
Admitting: NURSE PRACTITIONER

## 2021-05-24 DIAGNOSIS — I10 ESSENTIAL HYPERTENSION: ICD-10-CM

## 2021-05-24 DIAGNOSIS — E78.5 HYPERLIPIDEMIA, UNSPECIFIED HYPERLIPIDEMIA TYPE: ICD-10-CM

## 2021-05-24 DIAGNOSIS — R06.83 SNORING: ICD-10-CM

## 2021-05-24 PROCEDURE — 95810 POLYSOM 6/> YRS 4/> PARAM: CPT | Performed by: INTERNAL MEDICINE

## 2021-05-24 PROCEDURE — 95810 POLYSOM 6/> YRS 4/> PARAM: CPT

## 2021-05-25 DIAGNOSIS — R41.3 MEMORY LOSS: ICD-10-CM

## 2021-05-25 DIAGNOSIS — A77.0 RMSF (ROCKY MOUNTAIN SPOTTED FEVER): ICD-10-CM

## 2021-05-26 RX ORDER — DOXYCYCLINE 100 MG/1
100 CAPSULE ORAL EVERY 12 HOURS SCHEDULED
Qty: 28 CAPSULE | Refills: 0 | OUTPATIENT
Start: 2021-05-26

## 2021-06-18 ENCOUNTER — OFFICE VISIT (OUTPATIENT)
Dept: INTERNAL MEDICINE | Facility: CLINIC | Age: 56
End: 2021-06-18

## 2021-06-18 VITALS
OXYGEN SATURATION: 96 % | WEIGHT: 215 LBS | BODY MASS INDEX: 30.78 KG/M2 | RESPIRATION RATE: 16 BRPM | HEART RATE: 68 BPM | SYSTOLIC BLOOD PRESSURE: 122 MMHG | TEMPERATURE: 97.7 F | HEIGHT: 70 IN | DIASTOLIC BLOOD PRESSURE: 82 MMHG

## 2021-06-18 DIAGNOSIS — A77.0 RMSF (ROCKY MOUNTAIN SPOTTED FEVER): ICD-10-CM

## 2021-06-18 DIAGNOSIS — R41.3 MEMORY LOSS: ICD-10-CM

## 2021-06-18 DIAGNOSIS — G47.33 OSA (OBSTRUCTIVE SLEEP APNEA): Primary | ICD-10-CM

## 2021-06-18 PROCEDURE — 99214 OFFICE O/P EST MOD 30 MIN: CPT | Performed by: INTERNAL MEDICINE

## 2021-06-18 RX ORDER — DOXYCYCLINE 100 MG/1
100 CAPSULE ORAL EVERY 12 HOURS SCHEDULED
Qty: 60 CAPSULE | Refills: 0 | Status: SHIPPED | OUTPATIENT
Start: 2021-06-18 | End: 2021-08-10

## 2021-06-18 RX ORDER — DOXYCYCLINE 100 MG/1
100 CAPSULE ORAL EVERY 12 HOURS SCHEDULED
Qty: 28 CAPSULE | Refills: 0 | Status: CANCELLED | OUTPATIENT
Start: 2021-06-18

## 2021-06-18 NOTE — PROGRESS NOTES
"Subjective     Patient ID: Moreno Lorenzo is a 55 y.o. male. Patient is here for management of multiple medical problems.     Chief Complaint   Patient presents with   • Hypertension     History of Present Illness     htn    No better but no worse.    Not on doxy.   Ran out and felt better durring 2 weeks.  Memory loss with improvement.      The following portions of the patient's history were reviewed and updated as appropriate: allergies, current medications, past family history, past medical history, past social history, past surgical history and problem list.    Review of Systems   Constitutional: Negative for fatigue.   Psychiatric/Behavioral: Negative for self-injury and sleep disturbance. The patient is not nervous/anxious.    All other systems reviewed and are negative.      Current Outpatient Medications:   •  doxycycline (MONODOX) 100 MG capsule, Take 1 capsule by mouth Every 12 (Twelve) Hours., Disp: 28 capsule, Rfl: 0  •  lisinopril (PRINIVIL,ZESTRIL) 20 MG tablet, Take 1 tablet by mouth Daily., Disp: 90 tablet, Rfl: 3  •  doxycycline (MONODOX) 100 MG capsule, Take 1 capsule by mouth Every 12 (Twelve) Hours., Disp: 60 capsule, Rfl: 0    Objective      Blood pressure 122/82, pulse 68, temperature 97.7 °F (36.5 °C), resp. rate 16, height 177.8 cm (70\"), weight 97.5 kg (215 lb), SpO2 96 %.    Physical Exam     General Appearance:    Alert, cooperative, no distress, appears stated age   Head:    Normocephalic, without obvious abnormality, atraumatic   Eyes:    PERRL, conjunctiva/corneas clear, EOM's intact   Ears:    Normal TM's and external ear canals, both ears   Nose:   Nares normal, septum midline, mucosa normal, no drainage   or sinus tenderness   Throat:   Lips, mucosa, and tongue normal; teeth and gums normal   Neck:   Supple, symmetrical, trachea midline, no adenopathy;        thyroid:  No enlargement/tenderness/nodules; no carotid    bruit or JVD   Back:     Symmetric, no curvature, ROM normal, no " CVA tenderness   Lungs:     Clear to auscultation bilaterally, respirations unlabored   Chest wall:    No tenderness or deformity   Heart:    Regular rate and rhythm, S1 and S2 normal, no murmur,        rub or gallop   Abdomen:     Soft, non-tender, bowel sounds active all four quadrants,     no masses, no organomegaly   Extremities:   Extremities normal, atraumatic, no cyanosis or edema   Pulses:   2+ and symmetric all extremities   Skin:   Skin color, texture, turgor normal, no rashes or lesions   Lymph nodes:   Cervical, supraclavicular, and axillary nodes normal   Neurologic:   CNII-XII intact. Normal strength, sensation and reflexes       throughout      Results for orders placed or performed in visit on 03/19/21   PSA Screen    Specimen: Blood   Result Value Ref Range    PSA 0.393 0.000 - 4.000 ng/mL   Lipid Panel    Specimen: Blood   Result Value Ref Range    Total Cholesterol 240 (H) 0 - 200 mg/dL    Triglycerides 196 (H) 0 - 150 mg/dL    HDL Cholesterol 27 (L) 40 - 60 mg/dL    VLDL Cholesterol Bruce 37 5 - 40 mg/dL    LDL Chol Calc (NIH) 176 (H) 0 - 100 mg/dL   Vitamin B12    Specimen: Blood   Result Value Ref Range    Vitamin B-12 622 211 - 946 pg/mL   Comprehensive Metabolic Panel    Specimen: Blood   Result Value Ref Range    Glucose 94 65 - 99 mg/dL    BUN 17 6 - 20 mg/dL    Creatinine 0.94 0.76 - 1.27 mg/dL    eGFR Non African Am 83 >60 mL/min/1.73    eGFR African Am 100 >60 mL/min/1.73    BUN/Creatinine Ratio 18.1 7.0 - 25.0    Sodium 137 136 - 145 mmol/L    Potassium 4.3 3.5 - 5.2 mmol/L    Chloride 102 98 - 107 mmol/L    Total CO2 27.3 22.0 - 29.0 mmol/L    Calcium 8.9 8.6 - 10.5 mg/dL    Total Protein 7.1 6.0 - 8.5 g/dL    Albumin 4.70 3.50 - 5.20 g/dL    Globulin 2.4 gm/dL    A/G Ratio 2.0 g/dL    Total Bilirubin 0.5 0.0 - 1.2 mg/dL    Alkaline Phosphatase 97 39 - 117 U/L    AST (SGOT) 21 1 - 40 U/L    ALT (SGPT) 25 1 - 41 U/L   TSH    Specimen: Blood   Result Value Ref Range    TSH 2.010 0.270 -  4.200 uIU/mL   T4, Free    Specimen: Blood   Result Value Ref Range    Free T4 1.30 0.93 - 1.70 ng/dL   Vitamin B6    Specimen: Blood   Result Value Ref Range    Vitamin B6 75.9 (H) 5.3 - 46.7 ug/L   Estrogens, Total    Specimen: Blood   Result Value Ref Range    Estrogen 136 (H) 40 - 115 pg/mL   Testosterone (Free & Total), LC / MS    Specimen: Blood   Result Value Ref Range    Testosterone, Total 272.5 264.0 - 916.0 ng/dL    Testosterone, Free 6.9 (L) 7.2 - 24.0 pg/mL   Hemoglobin A1c    Specimen: Blood   Result Value Ref Range    Hemoglobin A1C 5.70 (H) 4.80 - 5.60 %   [place] Spotted Fever, IgM    Specimen: Blood   Result Value Ref Range    RMSF IgM 0.30 0.00 - 0.89 index   [facility] Spotted Fever, IgG    Specimen: Blood   Result Value Ref Range    RMSF IgG Positive (A) Negative   CBC & Differential    Specimen: Blood   Result Value Ref Range    WBC 5.88 3.40 - 10.80 10*3/mm3    RBC 5.17 4.14 - 5.80 10*6/mm3    Hemoglobin 15.8 13.0 - 17.7 g/dL    Hematocrit 46.0 37.5 - 51.0 %    MCV 89.0 79.0 - 97.0 fL    MCH 30.6 26.6 - 33.0 pg    MCHC 34.3 31.5 - 35.7 g/dL    RDW 12.9 12.3 - 15.4 %    Platelets 183 140 - 450 10*3/mm3    Neutrophil Rel % 56.3 42.7 - 76.0 %    Lymphocyte Rel % 33.5 19.6 - 45.3 %    Monocyte Rel % 7.1 5.0 - 12.0 %    Eosinophil Rel % 2.2 0.3 - 6.2 %    Basophil Rel % 0.7 0.0 - 1.5 %    Neutrophils Absolute 3.31 1.70 - 7.00 10*3/mm3    Lymphocytes Absolute 1.97 0.70 - 3.10 10*3/mm3    Monocytes Absolute 0.42 0.10 - 0.90 10*3/mm3    Eosinophils Absolute 0.13 0.00 - 0.40 10*3/mm3    Basophils Absolute 0.04 0.00 - 0.20 10*3/mm3    Immature Granulocyte Rel % 0.2 0.0 - 0.5 %    Immature Grans Absolute 0.01 0.00 - 0.05 10*3/mm3    nRBC 0.0 0.0 - 0.2 /100 WBC   Reflexed RMSF, IgG, IFA   Result Value Ref Range    RMSF IgG 1:64 (H) Neg <1:64         Assessment/Plan   Sever kim.    Teaching ans showing kim ans how to manage. Pt will to get started.   Message sent to Kassy.        Diagnoses and all  orders for this visit:    1. STEVEN (obstructive sleep apnea) (Primary)    2. Memory loss    3. RMSF ([place] spotted fever)    Other orders  -     Cancel: doxycycline (MONODOX) 100 MG capsule; Take 1 capsule by mouth Every 12 (Twelve) Hours.  Dispense: 28 capsule; Refill: 0  -     doxycycline (MONODOX) 100 MG capsule; Take 1 capsule by mouth Every 12 (Twelve) Hours.  Dispense: 60 capsule; Refill: 0      No follow-ups on file.          There are no Patient Instructions on file for this visit.     Dominick Vizcarra MD    Assessment/Plan       Answers for HPI/ROS submitted by the patient on 6/18/2021  What is the primary reason for your visit?: Other  Please describe your symptoms.: Sleep study follow-up and options.  Have you had these symptoms before?: No  How long have you been having these symptoms?: 1-4 days

## 2021-06-24 ENCOUNTER — TELEPHONE (OUTPATIENT)
Dept: NEUROLOGY | Facility: CLINIC | Age: 56
End: 2021-06-24

## 2021-06-24 NOTE — TELEPHONE ENCOUNTER
Attempted to contact patient to discuss sleep apnea. When patient was called and given results of sleep study patient denied set up.       No answer when calling patient, left voicemail for patient to return call.     Please transfer call to office.

## 2021-06-24 NOTE — TELEPHONE ENCOUNTER
----- Message from Moreno Lorenzo sent at 6/23/2021  7:49 PM EDT -----  Regarding: Prescription Question  Contact: [phone]  Consulted with Dr Vizcarra Friday concerning using CPAP machine and I agreed to use it however I haven't been contacted for setup and delivery of the machine.  Any assistance you can provide is greatly appreciated.

## 2021-08-10 ENCOUNTER — OFFICE VISIT (OUTPATIENT)
Dept: NEUROLOGY | Facility: CLINIC | Age: 56
End: 2021-08-10

## 2021-08-10 VITALS
WEIGHT: 221 LBS | BODY MASS INDEX: 31.64 KG/M2 | DIASTOLIC BLOOD PRESSURE: 64 MMHG | SYSTOLIC BLOOD PRESSURE: 118 MMHG | HEIGHT: 70 IN | TEMPERATURE: 97.3 F | HEART RATE: 59 BPM | OXYGEN SATURATION: 98 %

## 2021-08-10 DIAGNOSIS — G47.33 OBSTRUCTIVE SLEEP APNEA: Primary | ICD-10-CM

## 2021-08-10 PROCEDURE — 99212 OFFICE O/P EST SF 10 MIN: CPT | Performed by: NURSE PRACTITIONER

## 2021-08-10 NOTE — PROGRESS NOTES
New Sleep Patient Office Visit      Patient Name: Moreno Lorenzo  : 1965   MRN: [id number]     Referring Physician: No ref. provider found    Chief Complaint:    Chief Complaint   Patient presents with   • Sleep Apnea     pt in office for STEVEN follow up.       History of Present Illness: Moreno Lorenzo is a 55 y.o. male who is here today to follow up for STEVEN.  He was last seen on 2021.  PSG on 2021 showed severe STEVEN with an AHI of 38/hour.  Currently on AutoPap 6/16cm, mean pressure 6.4, AHI 2.6/hour, compliance 100%.  He is tolerating his pressures well.  He is using a nasal mask and uses [employer] [facility].  He does feel his memory and concentration are some better since starting PAP therapy.  Additional risk factors- BMI 31, HTN.     Pertinent Medical History:   Current compliance report reviewed and has been scanned into the patient's medical record.    Wanda Score: 2    Subjective      Review of Systems:   Review of Systems   Constitutional: Negative for chills, fatigue and fever.   HENT: Negative for facial swelling, hearing loss, sore throat, tinnitus and trouble swallowing.    Eyes: Negative for blurred vision, double vision, photophobia and visual disturbance.   Respiratory: Positive for apnea. Negative for cough, chest tightness and shortness of breath.    Cardiovascular: Negative for chest pain, palpitations and leg swelling.   Gastrointestinal: Negative for abdominal pain, nausea and vomiting.   Endocrine: Negative for cold intolerance and heat intolerance.   Musculoskeletal: Negative for gait problem, neck pain and neck stiffness.   Skin: Negative for color change and rash.   Allergic/Immunologic: Negative for environmental allergies and food allergies.   Neurological: Negative for dizziness, syncope, speech difficulty, weakness, light-headedness, numbness, headache and memory problem.   Psychiatric/Behavioral: Negative for behavioral problems, sleep disturbance and depressed mood. The  "patient is not nervous/anxious.        Past Medical History:   Past Medical History:   Diagnosis Date   • Abnormal liver enzymes    • Hyperlipidemia    • Hypertension    • Memory loss    • Polycythemia vera (CMS/HCC)    • Sleep apnea        Past Surgical History:   Past Surgical History:   Procedure Laterality Date   • VASECTOMY         Family History:   Family History   Problem Relation Age of Onset   • Hypertension Father    • Hyperlipidemia Father    • Stroke Father        Social History:   Social History     Socioeconomic History   • Marital status:      Spouse name: Not on file   • Number of children: Not on file   • Years of education: Not on file   • Highest education level: Not on file   Tobacco Use   • Smoking status: Never Smoker   • Smokeless tobacco: Never Used   Vaping Use   • Vaping Use: Never used   Substance and Sexual Activity   • Alcohol use: No   • Drug use: No   • Sexual activity: Yes     Partners: Female     Birth control/protection: None       Medications:     Current Outpatient Medications:   •  lisinopril (PRINIVIL,ZESTRIL) 20 MG tablet, Take 1 tablet by mouth Daily., Disp: 90 tablet, Rfl: 3    Allergies:   No Known Allergies    Objective     Physical Exam:  Vital Signs:   Vitals:    08/10/21 0942   BP: 118/64   Pulse: 59   Temp: 97.3 °F (36.3 °C)   SpO2: 98%   Weight: 100 kg (221 lb)   Height: 177.8 cm (70\")   PainSc: 0-No pain     BMI: Body mass index is 31.71 kg/m².    Physical Exam  Vitals and nursing note reviewed.   Constitutional:       General: He is not in acute distress.     Appearance: He is well-developed. He is not diaphoretic.   HENT:      Head: Normocephalic and atraumatic.   Eyes:      Conjunctiva/sclera: Conjunctivae normal.      Pupils: Pupils are equal, round, and reactive to light.   Pulmonary:      Effort: Pulmonary effort is normal. No respiratory distress.   Musculoskeletal:         General: Normal range of motion.   Skin:     General: Skin is dry.      Findings: " No rash.   Neurological:      Mental Status: He is alert and oriented to person, place, and time.   Psychiatric:         Mood and Affect: Mood normal.         Behavior: Behavior normal.         Thought Content: Thought content normal.         Judgment: Judgment normal.         Assessment / Plan      Assessment/Plan:   Diagnoses and all orders for this visit:    1. Obstructive sleep apnea (Primary)    2. BMI 31.0-31.9,adult    *He says his machine is not affected by the recent [employer] recall.   *Advised patient to avoid driving if drowsy.      Follow Up:   Return in about 6 months (around 2/10/2022) for F/U Obstructive Sleep Apnea.    I have advised the patient the need to continue the use of CPAP.  Gold standard for treatment of sleep apnea includes weight loss, use of cpap and avoidance of alcohol.  Untreated STEVEN may increase the risk for development of hypertension, stroke, myocardial infarction, diabetes, cardiovascular disease, work-related issues and driving accidents. I have counseled and advised the patient to avoid driving or operating heavy/dangerous equipment if feeling drowsy.     CALDERON Houser, FNP-C  [facility]       Please note that portions of this note may have been completed with a voice recognition program. Efforts were made to edit the dictations, but occasionally words are mistranscribed.

## 2021-08-20 ENCOUNTER — OFFICE VISIT (OUTPATIENT)
Dept: INTERNAL MEDICINE | Facility: CLINIC | Age: 56
End: 2021-08-20

## 2021-08-20 VITALS
HEART RATE: 63 BPM | BODY MASS INDEX: 31.21 KG/M2 | OXYGEN SATURATION: 99 % | WEIGHT: 218 LBS | RESPIRATION RATE: 16 BRPM | SYSTOLIC BLOOD PRESSURE: 113 MMHG | HEIGHT: 70 IN | TEMPERATURE: 98.2 F | DIASTOLIC BLOOD PRESSURE: 80 MMHG

## 2021-08-20 DIAGNOSIS — L23.7 POISON IVY DERMATITIS: ICD-10-CM

## 2021-08-20 DIAGNOSIS — I10 ESSENTIAL HYPERTENSION: Primary | ICD-10-CM

## 2021-08-20 PROCEDURE — 99214 OFFICE O/P EST MOD 30 MIN: CPT | Performed by: INTERNAL MEDICINE

## 2021-08-20 RX ORDER — BETAMETHASONE DIPROPIONATE 0.5 MG/G
CREAM TOPICAL 2 TIMES DAILY
Qty: 15 G | Refills: 0 | Status: SHIPPED | OUTPATIENT
Start: 2021-08-20 | End: 2022-08-26

## 2021-08-20 RX ORDER — LISINOPRIL 10 MG/1
10 TABLET ORAL DAILY
Qty: 90 TABLET | Refills: 3 | Status: SHIPPED | OUTPATIENT
Start: 2021-08-20 | End: 2022-04-11 | Stop reason: SDUPTHER

## 2021-08-20 NOTE — PROGRESS NOTES
"Subjective     Patient ID: Moreno Lorenzo is a 56 y.o. male. Patient is here for management of multiple medical problems.     Chief Complaint   Patient presents with   • Follow-up     rash on both arms posibly posion ivy      History of Present Illness   posion ivy on ac fossa both arma after weed eating    occ dizziness.        The following portions of the patient's history were reviewed and updated as appropriate: allergies, current medications, past family history, past medical history, past social history, past surgical history and problem list.    Review of Systems   Constitutional: Negative for fatigue.   Skin: Positive for rash.   Psychiatric/Behavioral: Negative for self-injury.       Current Outpatient Medications:   •  lisinopril (PRINIVIL,ZESTRIL) 10 MG tablet, Take 1 tablet by mouth Daily., Disp: 90 tablet, Rfl: 3  •  betamethasone dipropionate 0.05 % cream, Apply  topically to the appropriate area as directed 2 (Two) Times a Day., Disp: 15 g, Rfl: 0    Objective      Blood pressure 113/80, pulse 63, temperature 98.2 °F (36.8 °C), temperature source Temporal, resp. rate 16, height 177.8 cm (70\"), weight 98.9 kg (218 lb), SpO2 99 %.    Physical Exam     General Appearance:    Alert, cooperative, no distress, appears stated age   Head:    Normocephalic, without obvious abnormality, atraumatic   Eyes:    PERRL, conjunctiva/corneas clear, EOM's intact   Ears:    Normal TM's and external ear canals, both ears   Nose:   Nares normal, septum midline, mucosa normal, no drainage   or sinus tenderness   Throat:   Lips, mucosa, and tongue normal; teeth and gums normal   Neck:   Supple, symmetrical, trachea midline, no adenopathy;        thyroid:  No enlargement/tenderness/nodules; no carotid    bruit or JVD   Back:     Symmetric, no curvature, ROM normal, no CVA tenderness   Lungs:     Clear to auscultation bilaterally, respirations unlabored   Chest wall:    No tenderness or deformity   Heart:    Regular rate and " rhythm, S1 and S2 normal, no murmur,        rub or gallop   Abdomen:     Soft, non-tender, bowel sounds active all four quadrants,     no masses, no organomegaly   Extremities:   Extremities normal, atraumatic, no cyanosis or edema   Pulses:   2+ and symmetric all extremities   Skin:   Skin color, texture, turgor normal, no rashes or lesions   Lymph nodes:   Cervical, supraclavicular, and axillary nodes normal   Neurologic:   CNII-XII intact. Normal strength, sensation and reflexes       throughout      Results for orders placed or performed in visit on 03/19/21   PSA Screen    Specimen: Blood   Result Value Ref Range    PSA 0.393 0.000 - 4.000 ng/mL   Lipid Panel    Specimen: Blood   Result Value Ref Range    Total Cholesterol 240 (H) 0 - 200 mg/dL    Triglycerides 196 (H) 0 - 150 mg/dL    HDL Cholesterol 27 (L) 40 - 60 mg/dL    VLDL Cholesterol Bruce 37 5 - 40 mg/dL    LDL Chol Calc (NIH) 176 (H) 0 - 100 mg/dL   Vitamin B12    Specimen: Blood   Result Value Ref Range    Vitamin B-12 622 211 - 946 pg/mL   Comprehensive Metabolic Panel    Specimen: Blood   Result Value Ref Range    Glucose 94 65 - 99 mg/dL    BUN 17 6 - 20 mg/dL    Creatinine 0.94 0.76 - 1.27 mg/dL    eGFR Non African Am 83 >60 mL/min/1.73    eGFR African Am 100 >60 mL/min/1.73    BUN/Creatinine Ratio 18.1 7.0 - 25.0    Sodium 137 136 - 145 mmol/L    Potassium 4.3 3.5 - 5.2 mmol/L    Chloride 102 98 - 107 mmol/L    Total CO2 27.3 22.0 - 29.0 mmol/L    Calcium 8.9 8.6 - 10.5 mg/dL    Total Protein 7.1 6.0 - 8.5 g/dL    Albumin 4.70 3.50 - 5.20 g/dL    Globulin 2.4 gm/dL    A/G Ratio 2.0 g/dL    Total Bilirubin 0.5 0.0 - 1.2 mg/dL    Alkaline Phosphatase 97 39 - 117 U/L    AST (SGOT) 21 1 - 40 U/L    ALT (SGPT) 25 1 - 41 U/L   TSH    Specimen: Blood   Result Value Ref Range    TSH 2.010 0.270 - 4.200 uIU/mL   T4, Free    Specimen: Blood   Result Value Ref Range    Free T4 1.30 0.93 - 1.70 ng/dL   Vitamin B6    Specimen: Blood   Result Value Ref Range     Vitamin B6 75.9 (H) 5.3 - 46.7 ug/L   Estrogens, Total    Specimen: Blood   Result Value Ref Range    Estrogen 136 (H) 40 - 115 pg/mL   Testosterone (Free & Total), LC / MS    Specimen: Blood   Result Value Ref Range    Testosterone, Total 272.5 264.0 - 916.0 ng/dL    Testosterone, Free 6.9 (L) 7.2 - 24.0 pg/mL   Hemoglobin A1c    Specimen: Blood   Result Value Ref Range    Hemoglobin A1C 5.70 (H) 4.80 - 5.60 %   [place] Spotted Fever, IgM    Specimen: Blood   Result Value Ref Range    RMSF IgM 0.30 0.00 - 0.89 index   [facility] Spotted Fever, IgG    Specimen: Blood   Result Value Ref Range    RMSF IgG Positive (A) Negative   CBC & Differential    Specimen: Blood   Result Value Ref Range    WBC 5.88 3.40 - 10.80 10*3/mm3    RBC 5.17 4.14 - 5.80 10*6/mm3    Hemoglobin 15.8 13.0 - 17.7 g/dL    Hematocrit 46.0 37.5 - 51.0 %    MCV 89.0 79.0 - 97.0 fL    MCH 30.6 26.6 - 33.0 pg    MCHC 34.3 31.5 - 35.7 g/dL    RDW 12.9 12.3 - 15.4 %    Platelets 183 140 - 450 10*3/mm3    Neutrophil Rel % 56.3 42.7 - 76.0 %    Lymphocyte Rel % 33.5 19.6 - 45.3 %    Monocyte Rel % 7.1 5.0 - 12.0 %    Eosinophil Rel % 2.2 0.3 - 6.2 %    Basophil Rel % 0.7 0.0 - 1.5 %    Neutrophils Absolute 3.31 1.70 - 7.00 10*3/mm3    Lymphocytes Absolute 1.97 0.70 - 3.10 10*3/mm3    Monocytes Absolute 0.42 0.10 - 0.90 10*3/mm3    Eosinophils Absolute 0.13 0.00 - 0.40 10*3/mm3    Basophils Absolute 0.04 0.00 - 0.20 10*3/mm3    Immature Granulocyte Rel % 0.2 0.0 - 0.5 %    Immature Grans Absolute 0.01 0.00 - 0.05 10*3/mm3    nRBC 0.0 0.0 - 0.2 /100 WBC   Reflexed RMSF, IgG, IFA   Result Value Ref Range    RMSF IgG 1:64 (H) Neg <1:64         Assessment/Plan       Diagnoses and all orders for this visit:    1. Essential hypertension (Primary)    2. Poison ivy dermatitis    Other orders  -     lisinopril (PRINIVIL,ZESTRIL) 10 MG tablet; Take 1 tablet by mouth Daily.  Dispense: 90 tablet; Refill: 3  -     betamethasone dipropionate 0.05 % cream; Apply   topically to the appropriate area as directed 2 (Two) Times a Day.  Dispense: 15 g; Refill: 0      Return in about 1 year (around 8/20/2022) for Annual.          There are no Patient Instructions on file for this visit.     Dominick Vizcarra MD    Assessment/Plan       Answers for HPI/ROS submitted by the patient on 8/20/2021  What is the primary reason for your visit?: Other  Please describe your symptoms.: Follow-up  Have you had these symptoms before?: No  How long have you been having these symptoms?: 1-4 days

## 2022-04-12 RX ORDER — LISINOPRIL 10 MG/1
10 TABLET ORAL DAILY
Qty: 90 TABLET | Refills: 3 | Status: SHIPPED | OUTPATIENT
Start: 2022-04-12

## 2022-08-26 ENCOUNTER — OFFICE VISIT (OUTPATIENT)
Dept: INTERNAL MEDICINE | Facility: CLINIC | Age: 57
End: 2022-08-26

## 2022-08-26 VITALS
BODY MASS INDEX: 32.21 KG/M2 | HEART RATE: 69 BPM | OXYGEN SATURATION: 97 % | WEIGHT: 225 LBS | RESPIRATION RATE: 16 BRPM | SYSTOLIC BLOOD PRESSURE: 122 MMHG | DIASTOLIC BLOOD PRESSURE: 80 MMHG | TEMPERATURE: 98.4 F | HEIGHT: 70 IN

## 2022-08-26 DIAGNOSIS — E78.00 HYPERCHOLESTEREMIA: ICD-10-CM

## 2022-08-26 DIAGNOSIS — G47.33 OSA ON CPAP: ICD-10-CM

## 2022-08-26 DIAGNOSIS — Z99.89 OSA ON CPAP: ICD-10-CM

## 2022-08-26 DIAGNOSIS — I10 PRIMARY HYPERTENSION: ICD-10-CM

## 2022-08-26 DIAGNOSIS — Z00.00 ROUTINE GENERAL MEDICAL EXAMINATION AT A HEALTH CARE FACILITY: ICD-10-CM

## 2022-08-26 DIAGNOSIS — R07.89 OTHER CHEST PAIN: Primary | ICD-10-CM

## 2022-08-26 LAB
ALBUMIN SERPL-MCNC: 4.5 G/DL (ref 3.5–5.2)
ALBUMIN/GLOB SERPL: 2.1 G/DL
ALP SERPL-CCNC: 97 U/L (ref 39–117)
ALT SERPL-CCNC: 32 U/L (ref 1–41)
AST SERPL-CCNC: 21 U/L (ref 1–40)
BASOPHILS # BLD AUTO: 0.04 10*3/MM3 (ref 0–0.2)
BASOPHILS NFR BLD AUTO: 0.7 % (ref 0–1.5)
BILIRUB SERPL-MCNC: 0.6 MG/DL (ref 0–1.2)
BUN SERPL-MCNC: 20 MG/DL (ref 6–20)
BUN/CREAT SERPL: 16.3 (ref 7–25)
CALCIUM SERPL-MCNC: 8.9 MG/DL (ref 8.6–10.5)
CHLORIDE SERPL-SCNC: 104 MMOL/L (ref 98–107)
CHOLEST SERPL-MCNC: 206 MG/DL (ref 0–200)
CO2 SERPL-SCNC: 26.9 MMOL/L (ref 22–29)
CREAT SERPL-MCNC: 1.23 MG/DL (ref 0.76–1.27)
EGFRCR-CYS SERPLBLD CKD-EPI 2021: 67.6 ML/MIN/1.73
EOSINOPHIL # BLD AUTO: 0.14 10*3/MM3 (ref 0–0.4)
EOSINOPHIL NFR BLD AUTO: 2.5 % (ref 0.3–6.2)
ERYTHROCYTE [DISTWIDTH] IN BLOOD BY AUTOMATED COUNT: 13 % (ref 12.3–15.4)
GLOBULIN SER CALC-MCNC: 2.1 GM/DL
GLUCOSE SERPL-MCNC: 108 MG/DL (ref 65–99)
HCT VFR BLD AUTO: 46 % (ref 37.5–51)
HDLC SERPL-MCNC: 31 MG/DL (ref 40–60)
HGB BLD-MCNC: 15.2 G/DL (ref 13–17.7)
IMM GRANULOCYTES # BLD AUTO: 0.01 10*3/MM3 (ref 0–0.05)
IMM GRANULOCYTES NFR BLD AUTO: 0.2 % (ref 0–0.5)
LDLC SERPL CALC-MCNC: 144 MG/DL (ref 0–100)
LYMPHOCYTES # BLD AUTO: 1.61 10*3/MM3 (ref 0.7–3.1)
LYMPHOCYTES NFR BLD AUTO: 28.5 % (ref 19.6–45.3)
MCH RBC QN AUTO: 29.3 PG (ref 26.6–33)
MCHC RBC AUTO-ENTMCNC: 33 G/DL (ref 31.5–35.7)
MCV RBC AUTO: 88.8 FL (ref 79–97)
MONOCYTES # BLD AUTO: 0.48 10*3/MM3 (ref 0.1–0.9)
MONOCYTES NFR BLD AUTO: 8.5 % (ref 5–12)
NEUTROPHILS # BLD AUTO: 3.36 10*3/MM3 (ref 1.7–7)
NEUTROPHILS NFR BLD AUTO: 59.6 % (ref 42.7–76)
NRBC BLD AUTO-RTO: 0 /100 WBC (ref 0–0.2)
PLATELET # BLD AUTO: 147 10*3/MM3 (ref 140–450)
POTASSIUM SERPL-SCNC: 5 MMOL/L (ref 3.5–5.2)
PROT SERPL-MCNC: 6.6 G/DL (ref 6–8.5)
RBC # BLD AUTO: 5.18 10*6/MM3 (ref 4.14–5.8)
SODIUM SERPL-SCNC: 140 MMOL/L (ref 136–145)
TRIGL SERPL-MCNC: 168 MG/DL (ref 0–150)
TSH SERPL DL<=0.005 MIU/L-ACNC: 2.69 UIU/ML (ref 0.27–4.2)
VLDLC SERPL CALC-MCNC: 31 MG/DL (ref 5–40)
WBC # BLD AUTO: 5.64 10*3/MM3 (ref 3.4–10.8)

## 2022-08-26 PROCEDURE — 99396 PREV VISIT EST AGE 40-64: CPT | Performed by: INTERNAL MEDICINE

## 2022-08-26 NOTE — PROGRESS NOTES
"Subjective     Patient ID: Moreno Lorenzo is a 57 y.o. male. Patient is here for management of multiple medical problems.     Chief Complaint   Patient presents with   • Annual Exam     History of Present Illness     Annual exam    Feels well       occ cp thinks heart burn.   Years 2-3.   2 x a month.  No egd.  No associated with activity.    Associated with eating.  Not exercising.       The following portions of the patient's history were reviewed and updated as appropriate: allergies, current medications, past family history, past medical history, past social history, past surgical history and problem list.    Review of Systems   Constitutional: Negative for fatigue.   Cardiovascular: Positive for chest pain.   Psychiatric/Behavioral: Negative for sleep disturbance.   All other systems reviewed and are negative.      Current Outpatient Medications:   •  lisinopril (PRINIVIL,ZESTRIL) 10 MG tablet, Take 1 tablet by mouth Daily., Disp: 90 tablet, Rfl: 3    Objective      Blood pressure 122/80, pulse 69, temperature 98.4 °F (36.9 °C), resp. rate 16, height 177.8 cm (70\"), weight 102 kg (225 lb), SpO2 97 %.    Physical Exam     General Appearance:    Alert, cooperative, no distress, appears stated age   Head:    Normocephalic, without obvious abnormality, atraumatic   Eyes:    PERRL, conjunctiva/corneas clear, EOM's intact   Ears:    Normal TM's and external ear canals, both ears   Nose:   Nares normal, septum midline, mucosa normal, no drainage   or sinus tenderness   Throat:   Lips, mucosa, and tongue normal; teeth and gums normal   Neck:   Supple, symmetrical, trachea midline, no adenopathy;        thyroid:  No enlargement/tenderness/nodules; no carotid    bruit or JVD   Back:     Symmetric, no curvature, ROM normal, no CVA tenderness   Lungs:     Clear to auscultation bilaterally, respirations unlabored   Chest wall:    No tenderness or deformity   Heart:    Regular rate and rhythm, S1 and S2 normal, no murmur,     "    rub or gallop   Abdomen:     Soft, non-tender, bowel sounds active all four quadrants,     no masses, no organomegaly   Extremities:   Extremities normal, atraumatic, no cyanosis or edema   Pulses:   2+ and symmetric all extremities   Skin:   Skin color, texture, turgor normal, no rashes or lesions   Lymph nodes:   Cervical, supraclavicular, and axillary nodes normal   Neurologic:   CNII-XII intact. Normal strength, sensation and reflexes       throughout      Results for orders placed or performed in visit on 03/19/21   PSA Screen    Specimen: Blood   Result Value Ref Range    PSA 0.393 0.000 - 4.000 ng/mL   Lipid Panel    Specimen: Blood   Result Value Ref Range    Total Cholesterol 240 (H) 0 - 200 mg/dL    Triglycerides 196 (H) 0 - 150 mg/dL    HDL Cholesterol 27 (L) 40 - 60 mg/dL    VLDL Cholesterol Bruce 37 5 - 40 mg/dL    LDL Chol Calc (NIH) 176 (H) 0 - 100 mg/dL   Vitamin B12    Specimen: Blood   Result Value Ref Range    Vitamin B-12 622 211 - 946 pg/mL   Comprehensive Metabolic Panel    Specimen: Blood   Result Value Ref Range    Glucose 94 65 - 99 mg/dL    BUN 17 6 - 20 mg/dL    Creatinine 0.94 0.76 - 1.27 mg/dL    eGFR Non African Am 83 >60 mL/min/1.73    eGFR African Am 100 >60 mL/min/1.73    BUN/Creatinine Ratio 18.1 7.0 - 25.0    Sodium 137 136 - 145 mmol/L    Potassium 4.3 3.5 - 5.2 mmol/L    Chloride 102 98 - 107 mmol/L    Total CO2 27.3 22.0 - 29.0 mmol/L    Calcium 8.9 8.6 - 10.5 mg/dL    Total Protein 7.1 6.0 - 8.5 g/dL    Albumin 4.70 3.50 - 5.20 g/dL    Globulin 2.4 gm/dL    A/G Ratio 2.0 g/dL    Total Bilirubin 0.5 0.0 - 1.2 mg/dL    Alkaline Phosphatase 97 39 - 117 U/L    AST (SGOT) 21 1 - 40 U/L    ALT (SGPT) 25 1 - 41 U/L   TSH    Specimen: Blood   Result Value Ref Range    TSH 2.010 0.270 - 4.200 uIU/mL   T4, Free    Specimen: Blood   Result Value Ref Range    Free T4 1.30 0.93 - 1.70 ng/dL   Vitamin B6    Specimen: Blood   Result Value Ref Range    Vitamin B6 75.9 (H) 5.3 - 46.7 ug/L    Estrogens, Total    Specimen: Blood   Result Value Ref Range    Estrogen 136 (H) 40 - 115 pg/mL   Testosterone (Free & Total), LC / MS    Specimen: Blood   Result Value Ref Range    Testosterone, Total 272.5 264.0 - 916.0 ng/dL    Testosterone, Free 6.9 (L) 7.2 - 24.0 pg/mL   Hemoglobin A1c    Specimen: Blood   Result Value Ref Range    Hemoglobin A1C 5.70 (H) 4.80 - 5.60 %   [place] Spotted Fever, IgM    Specimen: Blood   Result Value Ref Range    RMSF IgM 0.30 0.00 - 0.89 index   [facility] Spotted Fever, IgG    Specimen: Blood   Result Value Ref Range    RMSF IgG Positive (A) Negative   CBC & Differential    Specimen: Blood   Result Value Ref Range    WBC 5.88 3.40 - 10.80 10*3/mm3    RBC 5.17 4.14 - 5.80 10*6/mm3    Hemoglobin 15.8 13.0 - 17.7 g/dL    Hematocrit 46.0 37.5 - 51.0 %    MCV 89.0 79.0 - 97.0 fL    MCH 30.6 26.6 - 33.0 pg    MCHC 34.3 31.5 - 35.7 g/dL    RDW 12.9 12.3 - 15.4 %    Platelets 183 140 - 450 10*3/mm3    Neutrophil Rel % 56.3 42.7 - 76.0 %    Lymphocyte Rel % 33.5 19.6 - 45.3 %    Monocyte Rel % 7.1 5.0 - 12.0 %    Eosinophil Rel % 2.2 0.3 - 6.2 %    Basophil Rel % 0.7 0.0 - 1.5 %    Neutrophils Absolute 3.31 1.70 - 7.00 10*3/mm3    Lymphocytes Absolute 1.97 0.70 - 3.10 10*3/mm3    Monocytes Absolute 0.42 0.10 - 0.90 10*3/mm3    Eosinophils Absolute 0.13 0.00 - 0.40 10*3/mm3    Basophils Absolute 0.04 0.00 - 0.20 10*3/mm3    Immature Granulocyte Rel % 0.2 0.0 - 0.5 %    Immature Grans Absolute 0.01 0.00 - 0.05 10*3/mm3    nRBC 0.0 0.0 - 0.2 /100 WBC   Reflexed RMSF, IgG, IFA   Result Value Ref Range    RMSF IgG 1:64 (H) Neg <1:64         Assessment & Plan   Pt needs to start exercising.  Will get cardiac eval first.    Not much change in diet. Some changes.    eval cp for underlying gasto disorder.          Diagnoses and all orders for this visit:    1. Other chest pain (Primary)  -     Ambulatory Referral to Gastroenterology  -     Ambulatory Referral to Cardiology  -     TSH  -      Comprehensive Metabolic Panel  -     Lipid Panel  -     CBC & Differential    2. Hypercholesteremia  -     Ambulatory Referral to Gastroenterology  -     Ambulatory Referral to Cardiology  -     TSH  -     Comprehensive Metabolic Panel  -     Lipid Panel  -     CBC & Differential    3. Primary hypertension  -     Ambulatory Referral to Gastroenterology  -     Ambulatory Referral to Cardiology  -     TSH  -     Comprehensive Metabolic Panel  -     Lipid Panel  -     CBC & Differential    4. Routine general medical examination at a health care facility  -     Ambulatory Referral to Gastroenterology  -     Ambulatory Referral to Cardiology  -     TSH  -     Comprehensive Metabolic Panel  -     Lipid Panel  -     CBC & Differential    5. STEVEN on CPAP      Return in about 8 months (around 4/26/2023).          There are no Patient Instructions on file for this visit.     Dominick Vizcarra MD    Assessment & Plan

## 2022-08-29 NOTE — PROGRESS NOTES
Please let them know the labs overall ok.  BS and trig a bit up. Diet and exercise will take care of it.

## 2022-09-27 ENCOUNTER — OFFICE VISIT (OUTPATIENT)
Dept: NEUROLOGY | Facility: CLINIC | Age: 57
End: 2022-09-27

## 2022-09-27 VITALS
HEART RATE: 68 BPM | WEIGHT: 224.6 LBS | SYSTOLIC BLOOD PRESSURE: 112 MMHG | TEMPERATURE: 97.7 F | OXYGEN SATURATION: 96 % | DIASTOLIC BLOOD PRESSURE: 80 MMHG | HEIGHT: 70 IN | BODY MASS INDEX: 32.16 KG/M2

## 2022-09-27 DIAGNOSIS — G47.33 OBSTRUCTIVE SLEEP APNEA: Primary | ICD-10-CM

## 2022-09-27 PROCEDURE — 99213 OFFICE O/P EST LOW 20 MIN: CPT | Performed by: NURSE PRACTITIONER

## 2022-09-27 NOTE — PROGRESS NOTES
Follow Up Office Visit      Patient Name: Moreno Lorenzo  : 1965   MRN: [id number]     Chief Complaint:    Chief Complaint   Patient presents with   • Follow-up     Patient in office to follow up on kim.       History of Present Illness: Moreno Lorenzo is a 57 y.o. male who is here today to follow up with KIM and was last seen on 8/10/2021.  Currently on AutoPap 6-16cm, mean pressure 7.7, AHI 5.6/hour, compliance 100%.  He is tolerating his pressures well.  He is using [employer].  He is using a nasal mask.  He has used the full face mask and some other styles but the nasal mask seems to work best for him.  He has had symptomatic improvement since starting AutoPap therapy- he feels rested upon awakening and he is no longer dozing off during the day.  He denies excessive daytime sleepiness.  Additional risk factors- BMI 32, HTN.   *Current compliance report reviewed and scanned into EMR.     Following taken from previous visit note:  Moreno Lorenzo is a 55 y.o. male who is here today to follow up for KIM.  He was last seen on 2021.  PSG on 2021 showed severe KIM with an AHI of 38/hour.  Currently on AutoPap 6/16cm, mean pressure 6.4, AHI 2.6/hour, compliance 100%.  He is tolerating his pressures well.  He is using a nasal mask and uses [employer].  He does feel his memory and concentration are some better since starting PAP therapy.  Additional risk factors- BMI 31, HTN.     Subjective      Review of Systems:   Review of Systems   Constitutional: Negative for chills, fatigue and fever.   HENT: Negative for facial swelling, hearing loss, sore throat, tinnitus and trouble swallowing.    Eyes: Negative for blurred vision, double vision, photophobia and visual disturbance.   Respiratory: Positive for apnea. Negative for cough, chest tightness and shortness of breath.    Cardiovascular: Negative for chest pain, palpitations and leg swelling.   Gastrointestinal: Negative for abdominal pain, nausea and  "vomiting.   Endocrine: Negative for cold intolerance and heat intolerance.   Musculoskeletal: Negative for gait problem, neck pain and neck stiffness.   Skin: Negative for color change and rash.   Allergic/Immunologic: Negative for environmental allergies and food allergies.   Neurological: Negative for dizziness, syncope, speech difficulty, weakness, light-headedness, numbness, headache and memory problem.   Psychiatric/Behavioral: Negative for behavioral problems, sleep disturbance and depressed mood. The patient is not nervous/anxious.        I have reviewed and the following portions of the patient's history were updated as appropriate: past family history, past medical history, past social history, past surgical history and problem list.    Medications:     Current Outpatient Medications:   •  lisinopril (PRINIVIL,ZESTRIL) 10 MG tablet, Take 1 tablet by mouth Daily., Disp: 90 tablet, Rfl: 3    Allergies:   No Known Allergies    Objective     Physical Exam:  Vital Signs:   Vitals:    09/27/22 1437   BP: 112/80   BP Location: Right arm   Patient Position: Sitting   Cuff Size: Adult   Pulse: 68   Temp: 97.7 °F (36.5 °C)   SpO2: 96%   Weight: 102 kg (224 lb 9.6 oz)   Height: 177.8 cm (70\")   PainSc: 0-No pain     Body mass index is 32.23 kg/m².    Physical Exam  Vitals and nursing note reviewed.   Constitutional:       General: He is not in acute distress.     Appearance: Normal appearance. He is well-developed. He is not diaphoretic.   HENT:      Head: Normocephalic and atraumatic.   Eyes:      Extraocular Movements: Extraocular movements intact.      Conjunctiva/sclera: Conjunctivae normal.      Pupils: Pupils are equal, round, and reactive to light.   Pulmonary:      Effort: Pulmonary effort is normal. No respiratory distress.   Musculoskeletal:         General: Normal range of motion.   Skin:     General: Skin is dry.      Findings: No rash.   Neurological:      Mental Status: He is alert and oriented to " person, place, and time.   Psychiatric:         Mood and Affect: Mood normal.         Behavior: Behavior normal.         Thought Content: Thought content normal.         Judgment: Judgment normal.         Neurologic Exam     Mental Status   Oriented to person, place, and time.     Cranial Nerves     CN III, IV, VI   Pupils are equal, round, and reactive to light.       Assessment / Plan      Assessment/Plan:   Diagnoses and all orders for this visit:    1. Obstructive sleep apnea (Primary)    2. BMI 32.0-32.9,adult    *Advised patient to avoid driving if drowsy.   *Order for supplies sent to [facility].   *Encouraged weight loss with a BMI goal of 24.     Follow Up:   Return in about 1 year (around 9/27/2023) for F/U Obstructive Sleep Apnea.    CALDERON Houser, FNP-C  [facility]       Please note that portions of this note may have been completed with a voice recognition program. Efforts were made to edit the dictations, but occasionally words are mistranscribed.

## 2022-10-19 ENCOUNTER — OFFICE VISIT (OUTPATIENT)
Dept: CARDIOLOGY | Facility: CLINIC | Age: 57
End: 2022-10-19

## 2022-10-19 VITALS
DIASTOLIC BLOOD PRESSURE: 88 MMHG | HEART RATE: 71 BPM | HEIGHT: 70 IN | OXYGEN SATURATION: 95 % | BODY MASS INDEX: 31.75 KG/M2 | WEIGHT: 221.8 LBS | SYSTOLIC BLOOD PRESSURE: 140 MMHG

## 2022-10-19 DIAGNOSIS — E78.2 MIXED HYPERLIPIDEMIA: ICD-10-CM

## 2022-10-19 DIAGNOSIS — I20.0 UNSTABLE ANGINA PECTORIS: Primary | ICD-10-CM

## 2022-10-19 DIAGNOSIS — I10 PRIMARY HYPERTENSION: ICD-10-CM

## 2022-10-19 PROCEDURE — 99204 OFFICE O/P NEW MOD 45 MIN: CPT | Performed by: INTERNAL MEDICINE

## 2022-10-19 PROCEDURE — 93000 ELECTROCARDIOGRAM COMPLETE: CPT | Performed by: INTERNAL MEDICINE

## 2022-10-19 NOTE — PROGRESS NOTES
[facility] Cardiology  Consultation H&P  Moreno Lorenzo  1965  [address] [id number]     VISIT DATE:  10/19/22    PCP: Dominick Vizcarra MD  [address]  [facility] [id number]    IDENTIFICATION: A 57 y.o. male former [employer] current  with Raquel Beltran    PROBLEM LIST:  Chest pain   HTN  HL  8/22 206/[id number]/31/144  STEVEN -on NIV    CC:  Chief Complaint   Patient presents with   • Chest Pain   • Hypertension   • Hyperlipidemia       Allergies  No Known Allergies    Current Medications    Current Outpatient Medications:   •  lisinopril (PRINIVIL,ZESTRIL) 10 MG tablet, Take 1 tablet by mouth Daily., Disp: 90 tablet, Rfl: 3     History of Present Illness   HPI  Moreno Lorenzo is a 57 y.o. year old male with the above mentioned PMH who presents for consult from Dominick Vizcarra MD for evaluation of chest pain.  Patient states for the last several months he has had intermittent occasional discomfort in his chest he cannot discern any alleviating or aggravating factors.  He cannot state that he short of breath palpitations or presyncope he has not attempted anything to see if this is GI related.    Pt denies any  dyspnea at rest, dyspnea on exertion, orthopnea, PND, palpitations, lower extremity edema, or claudication. Pt denies history of CHF, DVT, PE, MI, CVA, TIA, or rheumatic fever.   He does not exercise regularly    ROS  Review of Systems   Constitutional: Negative for chills, fever, malaise/fatigue, night sweats, weight gain and weight loss.   HENT: Negative for hearing loss and nosebleeds.    Eyes: Negative for blurred vision, vision loss in left eye, vision loss in right eye, visual disturbance and visual halos.   Cardiovascular: Positive for chest pain. Negative for claudication, cyanosis, dyspnea on exertion, irregular heartbeat, leg swelling, near-syncope, orthopnea, palpitations, paroxysmal nocturnal dyspnea and syncope.   Respiratory:  "Negative for cough, hemoptysis, shortness of breath, snoring and wheezing.    Endocrine: Negative for cold intolerance, heat intolerance, polydipsia, polyphagia and polyuria.   Hematologic/Lymphatic: Negative for adenopathy and bleeding problem. Does not bruise/bleed easily.   Skin: Negative for dry skin, poor wound healing and rash.   Musculoskeletal: Negative for falls, joint pain, joint swelling, muscle cramps, muscle weakness, myalgias and neck pain.   Gastrointestinal: Negative for bloating, abdominal pain, change in bowel habit, bowel incontinence, constipation, diarrhea, dysphagia, excessive appetite, heartburn, hematemesis, hematochezia, jaundice, melena, nausea and vomiting.   Genitourinary: Negative for bladder incontinence, dysuria, flank pain, hematuria, hesitancy and nocturia.   Neurological: Negative for aphonia, excessive daytime sleepiness, dizziness, focal weakness, headaches, light-headedness, loss of balance, seizures, sensory change, tremors, vertigo and weakness.   Psychiatric/Behavioral: Negative for altered mental status, depression, memory loss, substance abuse and suicidal ideas. The patient is not nervous/anxious.    All other systems reviewed and are negative.      SOCIAL HX  Social History     Socioeconomic History   • Marital status:    Tobacco Use   • Smoking status: Never   • Smokeless tobacco: Never   Vaping Use   • Vaping Use: Never used   Substance and Sexual Activity   • Alcohol use: No   • Drug use: No   • Sexual activity: Yes     Partners: Female     Birth control/protection: None       FAMILY HX  Family History   Problem Relation Age of Onset   • Hypertension Father    • Hyperlipidemia Father    • Stroke Father    • Heart disease Father    Sister stent prior to age 60    Vitals:    10/19/22 1453   BP: 140/88   BP Location: Right arm   Patient Position: Sitting   Pulse: 71   SpO2: 95%   Weight: 101 kg (221 lb 12.8 oz)   Height: 177.8 cm (70\")     Body mass index is 31.82 " kg/m².     PHYSICAL EXAMINATION:  Constitutional:       Appearance: Healthy appearance. Not in distress.   Neck:      Vascular: No JVR. JVD normal.   Pulmonary:      Effort: Pulmonary effort is normal.      Breath sounds: Normal breath sounds. No wheezing. No rhonchi. No rales.   Chest:      Chest wall: Not tender to palpatation.   Cardiovascular:      PMI at left midclavicular line. Normal rate. Regular rhythm. Normal S1. Normal S2.      Murmurs: There is no murmur.      No gallop. No click. No rub.   Pulses:     Intact distal pulses.   Edema:     Peripheral edema absent.   Abdominal:      General: Bowel sounds are normal.      Palpations: Abdomen is soft.      Tenderness: There is no abdominal tenderness.   Musculoskeletal: Normal range of motion.         General: No tenderness. Skin:     General: Skin is warm and dry.   Neurological:      General: No focal deficit present.      Mental Status: Alert and oriented to person, place and time.         Diagnostic Data:    ECG 12 Lead    Date/Time: 10/19/2022 3:20 PM  Performed by: Fidel Reilly MD  Authorized by: Fidel Reilly MD   Previous ECG: no previous ECG available  Rhythm: sinus rhythm  BPM: 58  T inversion: all    Clinical impression: abnormal EKG          Lab Results   Component Value Date    CHLPL 206 (H) 08/26/2022    TRIG 168 (H) 08/26/2022    HDL 31 (L) 08/26/2022     Lab Results   Component Value Date    GLUCOSE 108 (H) 08/26/2022    BUN 20 08/26/2022    CREATININE 1.23 08/26/2022     08/26/2022    K 5.0 08/26/2022     08/26/2022    CO2 26.9 08/26/2022     Lab Results   Component Value Date    HGBA1C 5.70 (H) 03/19/2021     Lab Results   Component Value Date    WBC 5.64 08/26/2022    HGB 15.2 08/26/2022    HCT 46.0 08/26/2022     08/26/2022       ASSESSMENT:   Diagnosis Plan   1. Unstable angina pectoris (HCC)        2. Mixed hyperlipidemia        3. Primary hypertension            PLAN:  Anginal equivalent with family history of  "precocious atherosclerosis and marked dyslipidemia with suppressed HDL elevated triglyceride.  Patient historically had tried cholesterol medication and states that he cannot recall which agent it was but it made him\" feel terrible\"  Exercise Cardiolite at the nearest convenience outside of [facility] as it is cost prohibitive with his private insurance    Dyslipidemia untreated recommended lipid modifying therapy and reiterated indication for plaque stabilization patient wishes to await follow-up of stress test    Hypertension controlled on lisinopril    STEVEN on CPAP    Preventative recommended minimum of 150 minutes of aerobic activity a week      Dominick Vizcarra MD, thank you for referring Mr. Lorenzo for evaluation.  I have forwarded my electronically generated recommendations to you for review.  Please do not hesitate to call with any questions.      Fidel Reilly MD, FACC      "

## 2022-10-20 ENCOUNTER — PATIENT MESSAGE (OUTPATIENT)
Dept: CARDIOLOGY | Facility: CLINIC | Age: 57
End: 2022-10-20

## 2022-12-01 ENCOUNTER — OFFICE VISIT (OUTPATIENT)
Dept: GASTROENTEROLOGY | Facility: CLINIC | Age: 57
End: 2022-12-01

## 2022-12-01 VITALS
SYSTOLIC BLOOD PRESSURE: 105 MMHG | DIASTOLIC BLOOD PRESSURE: 62 MMHG | BODY MASS INDEX: 32.64 KG/M2 | HEIGHT: 70 IN | WEIGHT: 228 LBS | HEART RATE: 59 BPM | TEMPERATURE: 98.4 F

## 2022-12-01 DIAGNOSIS — E66.1 CLASS 1 DRUG-INDUCED OBESITY WITHOUT SERIOUS COMORBIDITY WITH BODY MASS INDEX (BMI) OF 32.0 TO 32.9 IN ADULT: ICD-10-CM

## 2022-12-01 DIAGNOSIS — R07.89 ATYPICAL CHEST PAIN: Primary | ICD-10-CM

## 2022-12-01 DIAGNOSIS — Z12.11 ENCOUNTER FOR SCREENING FOR MALIGNANT NEOPLASM OF COLON: ICD-10-CM

## 2022-12-01 PROCEDURE — 99204 OFFICE O/P NEW MOD 45 MIN: CPT | Performed by: INTERNAL MEDICINE

## 2022-12-01 RX ORDER — SODIUM CHLORIDE 9 MG/ML
70 INJECTION, SOLUTION INTRAVENOUS CONTINUOUS PRN
Status: CANCELLED | OUTPATIENT
Start: 2022-12-01

## 2022-12-01 RX ORDER — PANTOPRAZOLE SODIUM 20 MG/1
20 TABLET, DELAYED RELEASE ORAL DAILY
Qty: 30 TABLET | Refills: 2 | Status: SHIPPED | OUTPATIENT
Start: 2022-12-01 | End: 2023-03-01

## 2022-12-01 RX ORDER — MULTIPLE VITAMINS W/ MINERALS TAB 9MG-400MCG
1 TAB ORAL DAILY
COMMUNITY

## 2022-12-01 NOTE — PROGRESS NOTES
New Patient Consult      Date: 2022   Patient Name: Moreno Lorenzo  MRN: [id number]  : 1965     Referring Physician: Dominick Vizcarra MD    Chief Complaint   Patient presents with   • Chest pain       History of Present Illness: Moreno Lorenzo is a 57 y.o. male who is here today to establish care with Gastroenterology for  evaluation of lower chest to retrosternal discomfort since about 2 months.    Patient states that he started noticing pressure-like sensation retrosternal area lower chest intermittently about 2 months.  This was happening mostly about an hour of her eating and going to bed.  He saw his PCP 2 months ago and referral made for GI and cardiology.  He states that since then he has not had any episodes.  He is almost 2 months since his last office visit with the PCP.  He denies any episodes like this with exertion.     No retrosternal burning no acid reflux as such.  He denies any B symptoms with the swallowing.  Cardiology work-up is pending now.    He denies any abdominal pain change in bowel habit hematochezia or melena.  Weight is stable. Pt denies nausea vomiting or odynophagia or dysphagia. There is no history of acid reflux. There is no history of anemia. No prior history of EGD or colonoscopy. No family history of colon cancer or any GI malignancy.  Denies alcohol abuse or cigarette smoking.       Subjective      Past Medical History:   Past Medical History:   Diagnosis Date   • Abnormal liver enzymes    • Erectile dysfunction 01/15/2015   • Hyperlipidemia    • Hypertension    • Memory loss    • Polycythemia vera (HCC)    • Sleep apnea        Past Surgical History:   Past Surgical History:   Procedure Laterality Date   • VASECTOMY N/A        Family History:   Family History   Problem Relation Age of Onset   • Hypertension Father    • Hyperlipidemia Father    • Stroke Father    • Heart disease Father    • Colon cancer Neg Hx    • Esophageal cancer Neg Hx    •  "Stomach cancer Neg Hx        Social History:   Social History     Socioeconomic History   • Marital status:    Tobacco Use   • Smoking status: Never   • Smokeless tobacco: Never   Vaping Use   • Vaping Use: Never used   Substance and Sexual Activity   • Alcohol use: Yes     Comment: social   • Drug use: Never   • Sexual activity: Defer     Birth control/protection: Vasectomy         Current Outpatient Medications:   •  Ascorbic Acid (VITAMIN C PO), Take  by mouth., Disp: , Rfl:   •  Coenzyme Q10 (CO Q 10 PO), Take  by mouth., Disp: , Rfl:   •  lisinopril (PRINIVIL,ZESTRIL) 10 MG tablet, Take 1 tablet by mouth Daily., Disp: 90 tablet, Rfl: 3  •  Multiple Vitamins-Minerals (ZINC PO), Take  by mouth., Disp: , Rfl:   •  multivitamin with minerals tablet tablet, Take 1 tablet by mouth Daily., Disp: , Rfl:   •  pantoprazole (Protonix) 20 MG EC tablet, Take 1 tablet by mouth Daily for 30 days., Disp: 30 tablet, Rfl: 2    No Known Allergies    Review of Systems:   Review of Systems   Constitutional: Negative for appetite change, fatigue, fever and unexpected weight loss.   HENT: Negative for trouble swallowing.    Cardiovascular: Positive for chest pain.   Gastrointestinal: Positive for GERD. Negative for abdominal distention, abdominal pain, anal bleeding, blood in stool, constipation, diarrhea, nausea, rectal pain, vomiting and indigestion.       The following portions of the patient's history were reviewed and updated as appropriate: allergies, current medications, past family history, past medical history, past social history, past surgical history and problem list.    Objective     Physical Exam:  Vital Signs:   Vitals:    12/01/22 1619   BP: 105/62   Pulse: 59   Temp: 98.4 °F (36.9 °C)   TempSrc: Infrared   Weight: 103 kg (228 lb)   Height: 177.8 cm (70\")       Physical Exam  Constitutional:       Appearance: Normal appearance.   HENT:      Head: Normocephalic and atraumatic.   Eyes:      Conjunctiva/sclera: " Conjunctivae normal.   Abdominal:      General: Abdomen is flat. There is no distension.      Palpations: There is no mass.      Tenderness: There is no abdominal tenderness. There is no guarding or rebound.      Hernia: No hernia is present.   Musculoskeletal:      Cervical back: Normal range of motion and neck supple.   Neurological:      Mental Status: He is alert.           Results Review:   I have reviewed the patient's new clinical and imaging results and agree with the interpretation.     No visits with results within 90 Day(s) from this visit.   Latest known visit with results is:   Office Visit on 08/26/2022   Component Date Value Ref Range Status   • TSH 08/26/2022 2.690  0.270 - 4.200 uIU/mL Final   • Glucose 08/26/2022 108 (H)  65 - 99 mg/dL Final   • BUN 08/26/2022 20  6 - 20 mg/dL Final   • Creatinine 08/26/2022 1.23  0.76 - 1.27 mg/dL Final   • EGFR Result 08/26/2022 67.6  >60.0 mL/min/1.73 Final    Comment: National Kidney Foundation and American Society of  Nephrology (ASN) Task Force recommended calculation based  on the Chronic Kidney Disease Epidemiology Collaboration  (CKD-EPI) equation refit without adjustment for race.  GFR Normal >60  Chronic Kidney Disease <60  Kidney Failure <15     • BUN/Creatinine Ratio 08/26/2022 16.3  7.0 - 25.0 Final   • Sodium 08/26/2022 140  136 - 145 mmol/L Final   • Potassium 08/26/2022 5.0  3.5 - 5.2 mmol/L Final   • Chloride 08/26/2022 104  98 - 107 mmol/L Final   • Total CO2 08/26/2022 26.9  22.0 - 29.0 mmol/L Final   • Calcium 08/26/2022 8.9  8.6 - 10.5 mg/dL Final   • Total Protein 08/26/2022 6.6  6.0 - 8.5 g/dL Final   • Albumin 08/26/2022 4.50  3.50 - 5.20 g/dL Final   • Globulin 08/26/2022 2.1  gm/dL Final   • A/G Ratio 08/26/2022 2.1  g/dL Final   • Total Bilirubin 08/26/2022 0.6  0.0 - 1.2 mg/dL Final   • Alkaline Phosphatase 08/26/2022 97  39 - 117 U/L Final   • AST (SGOT) 08/26/2022 21  1 - 40 U/L Final   • ALT (SGPT) 08/26/2022 32  1 - 41 U/L Final   •  Total Cholesterol 08/26/2022 206 (H)  0 - 200 mg/dL Final    Comment: Cholesterol Reference Ranges  (U.S. Department of Health and Human Services ATP III  Classifications)  Desirable          <200 mg/dL  Borderline High    200-239 mg/dL  High Risk          >240 mg/dL  Triglyceride Reference Ranges  (U.S. Department of Health and Human Services ATP III  Classifications)  Normal           <150 mg/dL  Borderline High  150-199 mg/dL  High             200-499 mg/dL  Very High        >500 mg/dL  HDL Reference Ranges  (U.S. Department of Health and Human Services ATP III  Classifications)  Low     <40 mg/dl (major risk factor for CHD)  High    >60 mg/dl ('negative' risk factor for CHD)  LDL Reference Ranges  (U.S. Department of Health and Human Services ATP III  Classifications)  Optimal          <100 mg/dL  Near Optimal     100-129 mg/dL  Borderline High  130-159 mg/dL  High             160-189 mg/dL  Very High        >189 mg/dL     • Triglycerides 08/26/2022 168 (H)  0 - 150 mg/dL Final   • HDL Cholesterol 08/26/2022 31 (L)  40 - 60 mg/dL Final   • VLDL Cholesterol Bruce 08/26/2022 31  5 - 40 mg/dL Final   • LDL Chol Calc (NIH) 08/26/2022 144 (H)  0 - 100 mg/dL Final   • WBC 08/26/2022 5.64  3.40 - 10.80 10*3/mm3 Final   • RBC 08/26/2022 5.18  4.14 - 5.80 10*6/mm3 Final   • Hemoglobin 08/26/2022 15.2  13.0 - 17.7 g/dL Final   • Hematocrit 08/26/2022 46.0  37.5 - 51.0 % Final   • MCV 08/26/2022 88.8  79.0 - 97.0 fL Final   • MCH 08/26/2022 29.3  26.6 - 33.0 pg Final   • MCHC 08/26/2022 33.0  31.5 - 35.7 g/dL Final   • RDW 08/26/2022 13.0  12.3 - 15.4 % Final   • Platelets 08/26/2022 147  140 - 450 10*3/mm3 Final   • Neutrophil Rel % 08/26/2022 59.6  42.7 - 76.0 % Final   • Lymphocyte Rel % 08/26/2022 28.5  19.6 - 45.3 % Final   • Monocyte Rel % 08/26/2022 8.5  5.0 - 12.0 % Final   • Eosinophil Rel % 08/26/2022 2.5  0.3 - 6.2 % Final   • Basophil Rel % 08/26/2022 0.7  0.0 - 1.5 % Final   • Neutrophils Absolute 08/26/2022  3.36  1.70 - 7.00 10*3/mm3 Final   • Lymphocytes Absolute 08/26/2022 1.61  0.70 - 3.10 10*3/mm3 Final   • Monocytes Absolute 08/26/2022 0.48  0.10 - 0.90 10*3/mm3 Final   • Eosinophils Absolute 08/26/2022 0.14  0.00 - 0.40 10*3/mm3 Final   • Basophils Absolute 08/26/2022 0.04  0.00 - 0.20 10*3/mm3 Final   • Immature Granulocyte Rel % 08/26/2022 0.2  0.0 - 0.5 % Final   • Immature Grans Absolute 08/26/2022 0.01  0.00 - 0.05 10*3/mm3 Final   • nRBC 08/26/2022 0.0  0.0 - 0.2 /100 WBC Final      No radiology results for the last 90 days.    Assessment / Plan      Assessment & Plan:  1. Atypical chest pain  Etiology is unclear  Patient history is not suggestive of GI related chest pain.  He has a lower retrosternal mid chest pressure sensation intermittently lasting for few seconds.  This happens mostly while he is going to bed.  Denies any episodes after exertion.  Denies any symptoms when he swallow anything.  No obvious history suggestive of acid reflux.  Patient is asymptomatic for the past 2 months.     Currently awaiting cardiology evaluation  We will start him on empiric trial of low-dose PPI.  Advised to start on PPI if there is any further episodes  We will schedule him for an EGD for further evaluation  We will recommend further after endoscopy.      - Case Request; Standing  - Implement Anesthesia Orders Day of Procedure; Standing  - Obtain Informed Consent; Standing  - Verify NPO; Standing  - Oxygen Therapy- Nasal Cannula; 2 LPM; Titrate for SPO2: equal to or greater than, 90%; Standing  - sodium chloride 0.9 % infusion  - Case Request    2. Encounter for screening for malignant neoplasm of colon  He had a Cologuard test done about 2 years ago which was negative as per patient.  No family history of any colon cancer  Recommend repeat Cologuard test in 2023    3. Class 1 drug-induced obesity without serious comorbidity with body mass index (BMI) of 32.0 to 32.9 in adult  Recent lab work reviewed and does not  reveal any elevated liver enzymes.  Patient does have risk factors including hypertension and increased cholesterol for fatty liver disease..  Discussion regarding lifestyle changes dietary changes and trying to lose weight to less than 200 pounds      Follow Up:   Return in about 3 months (around 3/1/2023).    ppi trial if pain develops again  EGD      Wt loss        Katelynn Carrasco MD  [facility] [place]  12/1/2022  18:02 EST    Please note that portions of this note may have been completed with a voice recognition program.

## 2022-12-01 NOTE — H&P (VIEW-ONLY)
New Patient Consult      Date: 2022   Patient Name: Moreno Lorenzo  MRN: [id number]  : 1965     Referring Physician: Dominick Vizcarra MD    Chief Complaint   Patient presents with   • Chest pain       History of Present Illness: Moreno Lorenzo is a 57 y.o. male who is here today to establish care with Gastroenterology for  evaluation of lower chest to retrosternal discomfort since about 2 months.    Patient states that he started noticing pressure-like sensation retrosternal area lower chest intermittently about 2 months.  This was happening mostly about an hour of her eating and going to bed.  He saw his PCP 2 months ago and referral made for GI and cardiology.  He states that since then he has not had any episodes.  He is almost 2 months since his last office visit with the PCP.  He denies any episodes like this with exertion.     No retrosternal burning no acid reflux as such.  He denies any B symptoms with the swallowing.  Cardiology work-up is pending now.    He denies any abdominal pain change in bowel habit hematochezia or melena.  Weight is stable. Pt denies nausea vomiting or odynophagia or dysphagia. There is no history of acid reflux. There is no history of anemia. No prior history of EGD or colonoscopy. No family history of colon cancer or any GI malignancy.  Denies alcohol abuse or cigarette smoking.       Subjective      Past Medical History:   Past Medical History:   Diagnosis Date   • Abnormal liver enzymes    • Erectile dysfunction 01/15/2015   • Hyperlipidemia    • Hypertension    • Memory loss    • Polycythemia vera (HCC)    • Sleep apnea        Past Surgical History:   Past Surgical History:   Procedure Laterality Date   • VASECTOMY N/A        Family History:   Family History   Problem Relation Age of Onset   • Hypertension Father    • Hyperlipidemia Father    • Stroke Father    • Heart disease Father    • Colon cancer Neg Hx    • Esophageal cancer Neg Hx    •  "Stomach cancer Neg Hx        Social History:   Social History     Socioeconomic History   • Marital status:    Tobacco Use   • Smoking status: Never   • Smokeless tobacco: Never   Vaping Use   • Vaping Use: Never used   Substance and Sexual Activity   • Alcohol use: Yes     Comment: social   • Drug use: Never   • Sexual activity: Defer     Birth control/protection: Vasectomy         Current Outpatient Medications:   •  Ascorbic Acid (VITAMIN C PO), Take  by mouth., Disp: , Rfl:   •  Coenzyme Q10 (CO Q 10 PO), Take  by mouth., Disp: , Rfl:   •  lisinopril (PRINIVIL,ZESTRIL) 10 MG tablet, Take 1 tablet by mouth Daily., Disp: 90 tablet, Rfl: 3  •  Multiple Vitamins-Minerals (ZINC PO), Take  by mouth., Disp: , Rfl:   •  multivitamin with minerals tablet tablet, Take 1 tablet by mouth Daily., Disp: , Rfl:   •  pantoprazole (Protonix) 20 MG EC tablet, Take 1 tablet by mouth Daily for 30 days., Disp: 30 tablet, Rfl: 2    No Known Allergies    Review of Systems:   Review of Systems   Constitutional: Negative for appetite change, fatigue, fever and unexpected weight loss.   HENT: Negative for trouble swallowing.    Cardiovascular: Positive for chest pain.   Gastrointestinal: Positive for GERD. Negative for abdominal distention, abdominal pain, anal bleeding, blood in stool, constipation, diarrhea, nausea, rectal pain, vomiting and indigestion.       The following portions of the patient's history were reviewed and updated as appropriate: allergies, current medications, past family history, past medical history, past social history, past surgical history and problem list.    Objective     Physical Exam:  Vital Signs:   Vitals:    12/01/22 1619   BP: 105/62   Pulse: 59   Temp: 98.4 °F (36.9 °C)   TempSrc: Infrared   Weight: 103 kg (228 lb)   Height: 177.8 cm (70\")       Physical Exam      Results Review:   I have reviewed the patient's new clinical and imaging results and agree with the interpretation.     No visits " with results within 90 Day(s) from this visit.   Latest known visit with results is:   Office Visit on 08/26/2022   Component Date Value Ref Range Status   • TSH 08/26/2022 2.690  0.270 - 4.200 uIU/mL Final   • Glucose 08/26/2022 108 (H)  65 - 99 mg/dL Final   • BUN 08/26/2022 20  6 - 20 mg/dL Final   • Creatinine 08/26/2022 1.23  0.76 - 1.27 mg/dL Final   • EGFR Result 08/26/2022 67.6  >60.0 mL/min/1.73 Final    Comment: National Kidney Foundation and American Society of  Nephrology (ASN) Task Force recommended calculation based  on the Chronic Kidney Disease Epidemiology Collaboration  (CKD-EPI) equation refit without adjustment for race.  GFR Normal >60  Chronic Kidney Disease <60  Kidney Failure <15     • BUN/Creatinine Ratio 08/26/2022 16.3  7.0 - 25.0 Final   • Sodium 08/26/2022 140  136 - 145 mmol/L Final   • Potassium 08/26/2022 5.0  3.5 - 5.2 mmol/L Final   • Chloride 08/26/2022 104  98 - 107 mmol/L Final   • Total CO2 08/26/2022 26.9  22.0 - 29.0 mmol/L Final   • Calcium 08/26/2022 8.9  8.6 - 10.5 mg/dL Final   • Total Protein 08/26/2022 6.6  6.0 - 8.5 g/dL Final   • Albumin 08/26/2022 4.50  3.50 - 5.20 g/dL Final   • Globulin 08/26/2022 2.1  gm/dL Final   • A/G Ratio 08/26/2022 2.1  g/dL Final   • Total Bilirubin 08/26/2022 0.6  0.0 - 1.2 mg/dL Final   • Alkaline Phosphatase 08/26/2022 97  39 - 117 U/L Final   • AST (SGOT) 08/26/2022 21  1 - 40 U/L Final   • ALT (SGPT) 08/26/2022 32  1 - 41 U/L Final   • Total Cholesterol 08/26/2022 206 (H)  0 - 200 mg/dL Final    Comment: Cholesterol Reference Ranges  (U.S. Department of Health and Human Services ATP III  Classifications)  Desirable          <200 mg/dL  Borderline High    200-239 mg/dL  High Risk          >240 mg/dL  Triglyceride Reference Ranges  (U.S. Department of Health and Human Services ATP III  Classifications)  Normal           <150 mg/dL  Borderline High  150-199 mg/dL  High             200-499 mg/dL  Very High        >500 mg/dL  HDL Reference  Ranges  (U.S. Department of Health and Human Services ATP III  Classifications)  Low     <40 mg/dl (major risk factor for CHD)  High    >60 mg/dl ('negative' risk factor for CHD)  LDL Reference Ranges  (U.S. Department of Health and Human Services ATP III  Classifications)  Optimal          <100 mg/dL  Near Optimal     100-129 mg/dL  Borderline High  130-159 mg/dL  High             160-189 mg/dL  Very High        >189 mg/dL     • Triglycerides 08/26/2022 168 (H)  0 - 150 mg/dL Final   • HDL Cholesterol 08/26/2022 31 (L)  40 - 60 mg/dL Final   • VLDL Cholesterol Bruce 08/26/2022 31  5 - 40 mg/dL Final   • LDL Chol Calc (NIH) 08/26/2022 144 (H)  0 - 100 mg/dL Final   • WBC 08/26/2022 5.64  3.40 - 10.80 10*3/mm3 Final   • RBC 08/26/2022 5.18  4.14 - 5.80 10*6/mm3 Final   • Hemoglobin 08/26/2022 15.2  13.0 - 17.7 g/dL Final   • Hematocrit 08/26/2022 46.0  37.5 - 51.0 % Final   • MCV 08/26/2022 88.8  79.0 - 97.0 fL Final   • MCH 08/26/2022 29.3  26.6 - 33.0 pg Final   • MCHC 08/26/2022 33.0  31.5 - 35.7 g/dL Final   • RDW 08/26/2022 13.0  12.3 - 15.4 % Final   • Platelets 08/26/2022 147  140 - 450 10*3/mm3 Final   • Neutrophil Rel % 08/26/2022 59.6  42.7 - 76.0 % Final   • Lymphocyte Rel % 08/26/2022 28.5  19.6 - 45.3 % Final   • Monocyte Rel % 08/26/2022 8.5  5.0 - 12.0 % Final   • Eosinophil Rel % 08/26/2022 2.5  0.3 - 6.2 % Final   • Basophil Rel % 08/26/2022 0.7  0.0 - 1.5 % Final   • Neutrophils Absolute 08/26/2022 3.36  1.70 - 7.00 10*3/mm3 Final   • Lymphocytes Absolute 08/26/2022 1.61  0.70 - 3.10 10*3/mm3 Final   • Monocytes Absolute 08/26/2022 0.48  0.10 - 0.90 10*3/mm3 Final   • Eosinophils Absolute 08/26/2022 0.14  0.00 - 0.40 10*3/mm3 Final   • Basophils Absolute 08/26/2022 0.04  0.00 - 0.20 10*3/mm3 Final   • Immature Granulocyte Rel % 08/26/2022 0.2  0.0 - 0.5 % Final   • Immature Grans Absolute 08/26/2022 0.01  0.00 - 0.05 10*3/mm3 Final   • nRBC 08/26/2022 0.0  0.0 - 0.2 /100 WBC Final      No radiology  results for the last 90 days.    Assessment / Plan      Assessment & Plan:  1. Atypical chest pain  Etiology is unclear  Patient history is not suggestive of GI related chest pain.  He has a lower retrosternal mid chest pressure sensation intermittently lasting for few seconds.  This happens mostly while he is going to bed.  Denies any episodes after exertion.  Denies any symptoms when he swallow anything.  No obvious history suggestive of acid reflux.  Patient is asymptomatic for the past 2 months.     Currently awaiting cardiology evaluation  We will start him on empiric trial of low-dose PPI.  Advised to start on PPI if there is any further episodes  We will schedule him for an EGD for further evaluation  We will recommend further after endoscopy.      - Case Request; Standing  - Implement Anesthesia Orders Day of Procedure; Standing  - Obtain Informed Consent; Standing  - Verify NPO; Standing  - Oxygen Therapy- Nasal Cannula; 2 LPM; Titrate for SPO2: equal to or greater than, 90%; Standing  - sodium chloride 0.9 % infusion  - Case Request    2. Encounter for screening for malignant neoplasm of colon  He had a Cologuard test done about 2 years ago which was negative as per patient.  No family history of any colon cancer  Recommend repeat Cologuard test in 2023    3. Class 1 drug-induced obesity without serious comorbidity with body mass index (BMI) of 32.0 to 32.9 in adult  Recent lab work reviewed and does not reveal any elevated liver enzymes.  Patient does have risk factors including hypertension and increased cholesterol for fatty liver disease..  Discussion regarding lifestyle changes dietary changes and trying to lose weight to less than 200 pounds      Follow Up:   Return in about 3 months (around 3/1/2023).    ppi trial if pain develops again  EGD      Wt loss        Katelynn Carrasco MD  [facility] [place]  12/1/2022  18:02 EST    Please note that portions of this note may have been completed  with a voice recognition program.

## 2022-12-02 PROBLEM — R07.89 ATYPICAL CHEST PAIN: Status: ACTIVE | Noted: 2022-12-02

## 2022-12-14 NOTE — PRE-PROCEDURE INSTRUCTIONS
PAT phone history completed with pt for upcoming procedure on 12/15/22, with Dr. Carrasco.     PAT PASS GIVEN/REVIEWED WITH PT.  VERBALIZED UNDERSTANDING OF THE FOLLOWING:  DO NOT EAT, DRINK, SMOKE, USE SMOKELESS TOBACCO OR CHEW GUM AFTER MIDNIGHT THE NIGHT BEFORE SURGERY.  THIS ALSO INCLUDES HARD CANDIES AND MINTS.    DO NOT SHAVE THE AREA TO BE OPERATED ON AT LEAST 48 HOURS PRIOR TO THE PROCEDURE.  DO NOT WEAR MAKE UP OR NAIL POLISH.  DO NOT LEAVE IN ANY PIERCING OR WEAR JEWELRY THE DAY OF SURGERY.      DO NOT USE ADHESIVES IF YOU WEAR DENTURES.    DO NOT WEAR EYE CONTACTS; BRING IN YOUR GLASSES.    ONLY TAKE MEDICATION THE MORNING OF YOUR PROCEDURE IF INSTRUCTED BY YOUR SURGEON WITH ENOUGH WATER TO SWALLOW THE MEDICATION.  IF YOUR SURGEON DID NOT SPECIFY WHICH MEDICATIONS TO TAKE, YOU WILL NEED TO CALL THEIR OFFICE FOR FURTHER INSTRUCTIONS AND DO AS THEY INSTRUCT.    LEAVE ANYTHING YOU CONSIDER VALUABLE AT HOME.    YOU WILL NEED TO ARRANGE FOR SOMEONE TO DRIVE YOU HOME AFTER SURGERY.  IT IS RECOMMENDED THAT YOU DO NOT DRIVE, WORK, DRINK ALCOHOL OR MAKE MAJOR DECISIONS FOR AT LEAST 24 HOURS AFTER YOUR PROCEDURE IS COMPLETE.      THE DAY OF YOUR PROCEDURE, BRING IN THE FOLLOWING IF APPLICABLE:   PICTURE ID AND INSURANCE/MEDICARE OR MEDICAID CARDS/ANY CO-PAY THAT MAY BE DUE   COPY OF ADVANCED DIRECTIVE/LIVING WILL/POWER OR    CPAP/BIPAP/INHALERS   SKIN PREP SHEET   YOUR PREADMISSION TESTING PASS (IF NOT A PHONE HISTORY)

## 2022-12-15 ENCOUNTER — ANESTHESIA (OUTPATIENT)
Dept: GASTROENTEROLOGY | Facility: HOSPITAL | Age: 57
End: 2022-12-15

## 2022-12-15 ENCOUNTER — ANESTHESIA EVENT (OUTPATIENT)
Dept: GASTROENTEROLOGY | Facility: HOSPITAL | Age: 57
End: 2022-12-15

## 2022-12-15 ENCOUNTER — HOSPITAL ENCOUNTER (OUTPATIENT)
Facility: HOSPITAL | Age: 57
Setting detail: HOSPITAL OUTPATIENT SURGERY
Discharge: HOME | End: 2022-12-15
Attending: INTERNAL MEDICINE | Admitting: INTERNAL MEDICINE

## 2022-12-15 VITALS
SYSTOLIC BLOOD PRESSURE: 158 MMHG | DIASTOLIC BLOOD PRESSURE: 98 MMHG | RESPIRATION RATE: 16 BRPM | OXYGEN SATURATION: 96 % | TEMPERATURE: 97.3 F | BODY MASS INDEX: 31.5 KG/M2 | WEIGHT: 220 LBS | HEART RATE: 69 BPM | HEIGHT: 70 IN

## 2022-12-15 DIAGNOSIS — R07.89 ATYPICAL CHEST PAIN: ICD-10-CM

## 2022-12-15 PROCEDURE — 43239 EGD BIOPSY SINGLE/MULTIPLE: CPT | Performed by: INTERNAL MEDICINE

## 2022-12-15 PROCEDURE — 25010000002 PROPOFOL 10 MG/ML EMULSION: Performed by: NURSE ANESTHETIST, CERTIFIED REGISTERED

## 2022-12-15 PROCEDURE — 88305 TISSUE EXAM BY PATHOLOGIST: CPT

## 2022-12-15 RX ORDER — SODIUM CHLORIDE 9 MG/ML
INJECTION, SOLUTION INTRAVENOUS CONTINUOUS PRN
Status: DISCONTINUED | OUTPATIENT
Start: 2022-12-15 | End: 2022-12-15 | Stop reason: SURG

## 2022-12-15 RX ORDER — LIDOCAINE HYDROCHLORIDE 20 MG/ML
INJECTION, SOLUTION INTRAVENOUS AS NEEDED
Status: DISCONTINUED | OUTPATIENT
Start: 2022-12-15 | End: 2022-12-15 | Stop reason: SURG

## 2022-12-15 RX ORDER — SODIUM CHLORIDE 9 MG/ML
70 INJECTION, SOLUTION INTRAVENOUS CONTINUOUS PRN
Status: DISCONTINUED | OUTPATIENT
Start: 2022-12-15 | End: 2022-12-15 | Stop reason: HOSPADM

## 2022-12-15 RX ADMIN — LIDOCAINE HYDROCHLORIDE 60 MG: 20 INJECTION, SOLUTION INTRAVENOUS at 10:01

## 2022-12-15 RX ADMIN — PROPOFOL 200 MCG/KG/MIN: 10 INJECTION, EMULSION INTRAVENOUS at 10:01

## 2022-12-15 RX ADMIN — SODIUM CHLORIDE: 9 INJECTION, SOLUTION INTRAVENOUS at 09:56

## 2022-12-15 RX ADMIN — SODIUM CHLORIDE 70 ML/HR: 9 INJECTION, SOLUTION INTRAVENOUS at 09:53

## 2022-12-15 NOTE — ANESTHESIA PREPROCEDURE EVALUATION
Anesthesia Evaluation     Patient summary reviewed and Nursing notes reviewed   NPO Solid Status: > 8 hours  NPO Liquid Status: > 8 hours           Airway   Mallampati: II  TM distance: >3 FB  Neck ROM: full  Possible difficult intubation and Difficult intubation highly probable  Dental      Pulmonary    (+) sleep apnea,   (-) not a smoker  Cardiovascular     ECG reviewed    (+) hypertension, hyperlipidemia,       Neuro/Psych  GI/Hepatic/Renal/Endo    (+) obesity,       Musculoskeletal     (+) arthralgias, myalgias,   Abdominal    Substance History      OB/GYN          Other                        Anesthesia Plan    ASA 3     MAC     (Risks and benefits discussed including risk of aspiration, recall and dental damage. All patient questions answered.    Will continue with plan of care.)  intravenous induction     Anesthetic plan, risks, benefits, and alternatives have been provided, discussed and informed consent has been obtained with: patient.  Pre-procedure education provided      CODE STATUS:

## 2022-12-15 NOTE — ANESTHESIA POSTPROCEDURE EVALUATION
Patient: Moreno Lorenzo II    Procedure Summary     Date: 12/15/22 Room / Location: [facility] / [facility]    Anesthesia Start: 0956 Anesthesia Stop: 1009    Procedure: ESOPHAGOGASTRODUODENOSCOPY WITH BIOPSY X2  (Esophagus) Diagnosis:       Atypical chest pain      (Atypical chest pain [R07.89])    Surgeons: Katelynn Carrasco MD Provider: iKmo Handy CRNA    Anesthesia Type: MAC ASA Status: 3          Anesthesia Type: MAC    Vitals  No vitals data found for the desired time range.          Post Anesthesia Care and Evaluation    Patient location during evaluation: PHASE II  Patient participation: complete - patient participated  Level of consciousness: awake and alert  Pain score: 0  Pain management: satisfactory to patient    Airway patency: patent  Anesthetic complications: No anesthetic complications  PONV Status: none  Cardiovascular status: acceptable and stable  Respiratory status: acceptable, nonlabored ventilation, spontaneous ventilation and unassisted  Hydration status: acceptable    Comments: Vitals signs as noted in nursing documentation as per protocol.

## 2022-12-15 NOTE — DISCHARGE INSTRUCTIONS
- Discharge patient to home (ambulatory).   - Resume previous diet.   - Continue present medications.   - Await pathology results.   - Return to my office in 8 weeks.     To assist you in voiding:  Drink plenty of fluids  Listen to running water while attempting to void.    If you are unable to urinate and you have an uncomfortable urge to void or it has been   6 hours since you were discharged, return to the Emergency Room Please follow all post op instructions and follow up appointment time from your physician's office included in your discharge packet.  .  Rest today  No pushing,pulling,tugging,heavy lifting, or strenuous activity   No major decision making,driving,or drinking alcoholic beverages for 24 hours due to the sedation you received  Always use good hand hygiene/washing technique  No driving on pain medication.

## 2022-12-16 LAB — REF LAB TEST METHOD: NORMAL

## 2023-03-01 RX ORDER — PANTOPRAZOLE SODIUM 20 MG/1
TABLET, DELAYED RELEASE ORAL
Qty: 30 TABLET | Refills: 0 | Status: SHIPPED | OUTPATIENT
Start: 2023-03-01

## 2023-03-02 ENCOUNTER — OFFICE VISIT (OUTPATIENT)
Dept: GASTROENTEROLOGY | Facility: CLINIC | Age: 58
End: 2023-03-02
Payer: COMMERCIAL

## 2023-03-02 VITALS
OXYGEN SATURATION: 98 % | DIASTOLIC BLOOD PRESSURE: 62 MMHG | WEIGHT: 222 LBS | HEIGHT: 70 IN | SYSTOLIC BLOOD PRESSURE: 122 MMHG | BODY MASS INDEX: 31.78 KG/M2 | HEART RATE: 69 BPM | TEMPERATURE: 98 F

## 2023-03-02 DIAGNOSIS — Z12.11 ENCOUNTER FOR SCREENING FOR MALIGNANT NEOPLASM OF COLON: Primary | ICD-10-CM

## 2023-03-02 DIAGNOSIS — R07.89 ATYPICAL CHEST PAIN: ICD-10-CM

## 2023-03-02 PROCEDURE — 99213 OFFICE O/P EST LOW 20 MIN: CPT | Performed by: INTERNAL MEDICINE

## 2023-03-02 RX ORDER — METRONIDAZOLE 10 MG/G
GEL TOPICAL
COMMUNITY
Start: 2022-12-12

## 2023-03-02 NOTE — PROGRESS NOTES
Follow Up Note     Date: 2023   Patient Name: Moreno Lorenzo II  MRN: [id number]  : 1965     Referring Physician: Dominick Vizcarra MD    Chief Complaint:    Chief Complaint   Patient presents with   • Follow-up     3 mth       Interval History:   3/2/2023  Moreno Lorenzo II is a 57 y.o. male who is here today for follow up for his history of atypical chest pain.  He had an EGD recently and he is here for follow-up after procedure.  He denies any current chest pain.    2022  Moreno Lorenzo is a 57 y.o. male who is here today to establish care with Gastroenterology for  evaluation of lower chest to retrosternal discomfort since about 2 months.     Patient states that he started noticing pressure-like sensation retrosternal area lower chest intermittently about 2 months.  This was happening mostly about an hour of her eating and going to bed.  He saw his PCP 2 months ago and referral made for GI and cardiology.  He states that since then he has not had any episodes.  He is almost 2 months since his last office visit with the PCP.  He denies any episodes like this with exertion.      No retrosternal burning no acid reflux as such.  He denies any B symptoms with the swallowing. Cardiology work-up is pending now. He denies any abdominal pain change in bowel habit hematochezia or melena.  Weight is stable. Pt denies nausea vomiting or odynophagia or dysphagia. There is no history of acid reflux. There is no history of anemia. No prior history of EGD or colonoscopy. No family history of colon cancer or any GI malignancy.  Denies alcohol abuse or cigarette smoking.      Subjective      Past Medical History:   Past Medical History:   Diagnosis Date   • Abnormal liver enzymes    • Elevated cholesterol    • Erectile dysfunction 01/15/2015   • Hyperlipidemia    • Hypertension    • Memory loss    • Sleep apnea      Past Surgical History:   Past Surgical History:   Procedure Laterality  Date   • ENDOSCOPY N/A 12/15/2022    Procedure: ESOPHAGOGASTRODUODENOSCOPY WITH BIOPSY X2 ;  Surgeon: Katelynn Carrasco MD;  Location: [facility] ENDOSCOPY;  Service: Gastroenterology;  Laterality: N/A;   • VASECTOMY N/A 1993       Family History:   Family History   Problem Relation Age of Onset   • Hypertension Father    • Hyperlipidemia Father    • Stroke Father    • Heart disease Father    • Colon cancer Neg Hx    • Esophageal cancer Neg Hx    • Stomach cancer Neg Hx        Social History:   Social History     Socioeconomic History   • Marital status:    Tobacco Use   • Smoking status: Never   • Smokeless tobacco: Never   Vaping Use   • Vaping Use: Never used   Substance and Sexual Activity   • Alcohol use: Yes     Comment: social   • Drug use: Never   • Sexual activity: Defer       Medications:     Current Outpatient Medications:   •  Ascorbic Acid (VITAMIN C PO), Take 1 tablet by mouth Daily., Disp: , Rfl:   •  Coenzyme Q10 (CO Q 10 PO), Take 1 tablet by mouth Daily., Disp: , Rfl:   •  lisinopril (PRINIVIL,ZESTRIL) 10 MG tablet, Take 1 tablet by mouth Daily., Disp: 90 tablet, Rfl: 3  •  metroNIDAZOLE (METROGEL) 1 % gel, APPLY TO FACE AT BEDTIME, Disp: , Rfl:   •  Multiple Vitamins-Minerals (ZINC PO), Take 1 tablet by mouth Daily., Disp: , Rfl:   •  multivitamin with minerals tablet tablet, Take 1 tablet by mouth Daily., Disp: , Rfl:   •  pantoprazole (PROTONIX) 20 MG EC tablet, TAKE 1 TABLET BY MOUTH EVERY DAY, Disp: 30 tablet, Rfl: 0    Allergies:   No Known Allergies    Review of Systems:   Review of Systems   Constitutional: Negative for appetite change, fatigue, fever and unexpected weight loss.   HENT: Negative for trouble swallowing.    Gastrointestinal: Negative for abdominal distention, abdominal pain, anal bleeding, blood in stool, constipation, diarrhea, nausea, rectal pain, vomiting, GERD and indigestion.       The following portions of the patient's history were reviewed and updated as  "appropriate: allergies, current medications, past family history, past medical history, past social history, past surgical history and problem list.    Objective     Physical Exam:  Vital Signs:   Vitals:    23 1610   BP: 122/62   Pulse: 69   Temp: 98 °F (36.7 °C)   SpO2: 98%   Weight: 101 kg (222 lb)   Height: 177.8 cm (70\")       Physical Exam  Constitutional:       Appearance: He is obese.   HENT:      Head: Normocephalic and atraumatic.   Eyes:      Conjunctiva/sclera: Conjunctivae normal.   Abdominal:      General: Abdomen is flat. There is no distension.      Palpations: There is no mass.      Tenderness: There is no abdominal tenderness. There is no guarding or rebound.      Hernia: No hernia is present.   Musculoskeletal:      Cervical back: Normal range of motion and neck supple.   Neurological:      Mental Status: He is alert.         Results Review:   I reviewed the patient's new clinical results.    Admission on 12/15/2022, Discharged on 12/15/2022   Component Date Value Ref Range Status   • Reference Lab Report 12/15/2022    Final                    Value:[facility]  [address] [place] [address]  Phone: [phone] or [phone]  Fax: [phone]  Jose Andrews M.D., Medical Director    PATIENT NAME                           LABORATORY NO.  ADAL HOGUE II.             [id number]  [id number]                         AGE              SEX  SSN           CLIENT REF #  [facility]            57      1965      [id number]   [id number]    [address]                REQUESTING M.D.     ATTENDING [facility]     COPY TO.   [address]                        [place] [address]                 [facility]  DATE COLLECTED      DATE RECEIVED      DATE REPORTED  12/15/2022          12/15/2022         2022    DIAGNOSIS:  A.   ANTRUM AND BODY, BIOPSY:  Benign gastric mucosa, negative for " "significant architectural distortion,  inflammatory infiltrate, neoplasia, malignancy  Negative for intestinal metaplasia  No Helicobacter-like                           organisms identified on H and E    B.   ESOPHAGUS, BIOPSY, DISTAL MID AND PROXIMAL:  Significant architectural distortion, inflammatory infiltrate, neoplasia,  malignancy  Negative for significant eosinophilia (less than 1 per HPF)  Negative for gastric type mucosa    CLINICAL HISTORY:  Atypical chest pain    SPECIMENS RECEIVED:  A.  ANTRUM AND BODY, BIOPSY  B.  ESOPHAGUS, BIOPSY, DISTAL MID AND PROXIMAL    MICROSCOPIC DESCRIPTION:  Tissue blocks are prepared and slides are examined microscopically on all  specimens. See diagnosis for details.    Professional interpretation rendered by Marci Bruner M.D., F.C.A.P. at  [facility], [address] [place] [address].    GROSS DESCRIPTION:  A.  Specimen is received in 1 formalin filled container labeled \"gastric antrum  and body biopsy\" consists of multiple pieces of tan-gray soft tissue  measuring 0.9 x 0.3 x 0.2 cm in aggregate.  All submitted in 1 cassette.  BKO  B.  Specimen is received in 1 formalin filled container                           labeled \"distal, mid,  and proximal esophagus biopsy\" consists of multiple pieces of tan-gray  soft tissue measuring 0.8 x 0.3 x 0.1 cm in aggregate.  All submitted in 1  cassette.  Note the smallest pieces may not survive processing.  BKO    REVIEWED, DIAGNOSED AND ELECTRONICALLY  SIGNED BY:    Marci Bruner M.D., F.C.A.P.  CPT CODES:  88305x2        No radiology results for the last 90 days.      EGD 12/15/2023  - Normal oropharynx.  - Z-line regular, 40 cm from the incisors.  - No gross lesions in the entire esophagus. Biopsied.  - No erosive gastritis or Mendoza's  - No signs of EoE  - Mild patchy erythematous mucosa in the posterior wall of the stomach and prepyloric region of the stomach.  Biopsied.  - Normal duodenal bulb, first portion " of the duodenum, second portion of the duodenum and third portion of the duodenum.  - No endoscopic findings nthat could explain his symptoms    Path; Normal esophagus/ gastric biopsy    Assessment / Plan      1. Atypical chest pain  Etiology is unclear  3/2/2023  No chest pain since the last episode in 2022.  No reflux symptoms.  No nausea vomiting.  No abdominal pain.  EGD done on 12/15/2023 did not reveal any significant esophageal gastric or duodenal abnormalities. Esophageal and gastric biopsies were normal.  Etiology of his symptoms are not clear however patient does not have any current ongoing symptoms.   We will simply monitor     12/1/2022   patient history is not suggestive of GI related chest pain.  He has a lower retrosternal mid chest pressure sensation intermittently lasting for few seconds.  This happens mostly while he is going to bed.  Denies any episodes after exertion. Denies any symptoms when he swallow anything.  No obvious history suggestive of acid reflux.  Patient is asymptomatic for the past 2 months.      2. Encounter for screening for malignant neoplasm of colon  Cologuard test done about 2 years ago which was negative.  No family history of any colon cancer  He is due for his Cologuard test and will order one.      Prior history  3. Class 1 drug-induced obesity without serious comorbidity with body mass index (BMI) of 32.0 to 32.9 in adult  Recent lab work reviewed and does not reveal any elevated liver enzymes.  Patient does have risk factors including hypertension and increased cholesterol for fatty liver disease..  Discussion regarding lifestyle changes dietary changes and trying to lose weight to less than 200 pounds       Follow Up:   Return if symptoms worsen or fail to improve.    Katelynn Carrasco MD  [facility]  3/2/2023  17:33 EST     Please note that portions of this note may have been completed with a voice recognition program.

## 2023-03-03 ENCOUNTER — TELEPHONE (OUTPATIENT)
Dept: GASTROENTEROLOGY | Facility: CLINIC | Age: 58
End: 2023-03-03
Payer: COMMERCIAL

## 2023-03-03 NOTE — TELEPHONE ENCOUNTER
----- Message from Katelynn Carrasco MD sent at 3/2/2023  5:39 PM EST -----  Let him know that he is due for his Cologuard test and I have ordered 1

## 2023-04-28 ENCOUNTER — OFFICE VISIT (OUTPATIENT)
Dept: INTERNAL MEDICINE | Facility: CLINIC | Age: 58
End: 2023-04-28
Payer: COMMERCIAL

## 2023-04-28 VITALS
HEART RATE: 64 BPM | OXYGEN SATURATION: 96 % | BODY MASS INDEX: 30.78 KG/M2 | SYSTOLIC BLOOD PRESSURE: 120 MMHG | TEMPERATURE: 97.6 F | RESPIRATION RATE: 16 BRPM | DIASTOLIC BLOOD PRESSURE: 80 MMHG | HEIGHT: 70 IN | WEIGHT: 215 LBS

## 2023-04-28 DIAGNOSIS — I10 PRIMARY HYPERTENSION: Primary | ICD-10-CM

## 2023-04-28 DIAGNOSIS — Z12.5 PROSTATE CANCER SCREENING: ICD-10-CM

## 2023-04-28 DIAGNOSIS — G47.33 OSA ON CPAP: ICD-10-CM

## 2023-04-28 DIAGNOSIS — E78.00 HYPERCHOLESTEREMIA: ICD-10-CM

## 2023-04-28 DIAGNOSIS — Z99.89 OSA ON CPAP: ICD-10-CM

## 2023-04-28 LAB
ALBUMIN SERPL-MCNC: 4.3 G/DL (ref 3.5–5.2)
ALBUMIN/GLOB SERPL: 1.9 G/DL
ALP SERPL-CCNC: 108 U/L (ref 39–117)
ALT SERPL-CCNC: 23 U/L (ref 1–41)
AST SERPL-CCNC: 20 U/L (ref 1–40)
BASOPHILS # BLD AUTO: 0.05 10*3/MM3 (ref 0–0.2)
BASOPHILS NFR BLD AUTO: 0.6 % (ref 0–1.5)
BILIRUB SERPL-MCNC: 0.5 MG/DL (ref 0–1.2)
BUN SERPL-MCNC: 15 MG/DL (ref 6–20)
BUN/CREAT SERPL: 10.6 (ref 7–25)
CALCIUM SERPL-MCNC: 9 MG/DL (ref 8.6–10.5)
CHLORIDE SERPL-SCNC: 102 MMOL/L (ref 98–107)
CHOLEST SERPL-MCNC: 175 MG/DL (ref 0–200)
CO2 SERPL-SCNC: 27.6 MMOL/L (ref 22–29)
CREAT SERPL-MCNC: 1.42 MG/DL (ref 0.76–1.27)
EGFRCR SERPLBLD CKD-EPI 2021: 56.9 ML/MIN/1.73
EOSINOPHIL # BLD AUTO: 0.2 10*3/MM3 (ref 0–0.4)
EOSINOPHIL NFR BLD AUTO: 2.3 % (ref 0.3–6.2)
ERYTHROCYTE [DISTWIDTH] IN BLOOD BY AUTOMATED COUNT: 13 % (ref 12.3–15.4)
GLOBULIN SER CALC-MCNC: 2.3 GM/DL
GLUCOSE SERPL-MCNC: 93 MG/DL (ref 65–99)
HCT VFR BLD AUTO: 53.3 % (ref 37.5–51)
HDLC SERPL-MCNC: 28 MG/DL (ref 40–60)
HGB BLD-MCNC: 17.8 G/DL (ref 13–17.7)
IMM GRANULOCYTES # BLD AUTO: 0.02 10*3/MM3 (ref 0–0.05)
IMM GRANULOCYTES NFR BLD AUTO: 0.2 % (ref 0–0.5)
LDLC SERPL CALC-MCNC: 122 MG/DL (ref 0–100)
LYMPHOCYTES # BLD AUTO: 1.69 10*3/MM3 (ref 0.7–3.1)
LYMPHOCYTES NFR BLD AUTO: 19.2 % (ref 19.6–45.3)
MCH RBC QN AUTO: 30 PG (ref 26.6–33)
MCHC RBC AUTO-ENTMCNC: 33.4 G/DL (ref 31.5–35.7)
MCV RBC AUTO: 89.9 FL (ref 79–97)
MONOCYTES # BLD AUTO: 0.65 10*3/MM3 (ref 0.1–0.9)
MONOCYTES NFR BLD AUTO: 7.4 % (ref 5–12)
NEUTROPHILS # BLD AUTO: 6.18 10*3/MM3 (ref 1.7–7)
NEUTROPHILS NFR BLD AUTO: 70.3 % (ref 42.7–76)
NRBC BLD AUTO-RTO: 0 /100 WBC (ref 0–0.2)
PLATELET # BLD AUTO: 143 10*3/MM3 (ref 140–450)
POTASSIUM SERPL-SCNC: 5.3 MMOL/L (ref 3.5–5.2)
PROT SERPL-MCNC: 6.6 G/DL (ref 6–8.5)
PSA SERPL-MCNC: 0.51 NG/ML (ref 0–4)
RBC # BLD AUTO: 5.93 10*6/MM3 (ref 4.14–5.8)
SODIUM SERPL-SCNC: 139 MMOL/L (ref 136–145)
T4 FREE SERPL-MCNC: 1.09 NG/DL (ref 0.93–1.7)
TRIGL SERPL-MCNC: 137 MG/DL (ref 0–150)
TSH SERPL DL<=0.005 MIU/L-ACNC: 3.18 UIU/ML (ref 0.27–4.2)
VIT B12 SERPL-MCNC: 1465 PG/ML (ref 211–946)
VLDLC SERPL CALC-MCNC: 25 MG/DL (ref 5–40)
WBC # BLD AUTO: 8.79 10*3/MM3 (ref 3.4–10.8)

## 2023-04-28 PROCEDURE — 99214 OFFICE O/P EST MOD 30 MIN: CPT | Performed by: INTERNAL MEDICINE

## 2023-04-28 RX ORDER — LISINOPRIL 10 MG/1
TABLET ORAL
Qty: 90 TABLET | Refills: 3 | Status: SHIPPED | OUTPATIENT
Start: 2023-04-28

## 2023-04-28 NOTE — PROGRESS NOTES
"Subjective     Patient ID: Moreno Lorenzo II is a 57 y.o. male. Patient is here for management of multiple medical problems.     Chief Complaint   Patient presents with   • Hypertension     History of Present Illness   htn      The following portions of the patient's history were reviewed and updated as appropriate: allergies, current medications, past family history, past medical history, past social history, past surgical history and problem list.    Review of Systems    Current Outpatient Medications:   •  Ascorbic Acid (VITAMIN C PO), Take 1 tablet by mouth Daily., Disp: , Rfl:   •  Coenzyme Q10 (CO Q 10 PO), Take 1 tablet by mouth Daily., Disp: , Rfl:   •  lisinopril (PRINIVIL,ZESTRIL) 10 MG tablet, Take 1 tablet by mouth Daily., Disp: 90 tablet, Rfl: 3  •  Multiple Vitamins-Minerals (ZINC PO), Take 1 tablet by mouth Daily., Disp: , Rfl:   •  multivitamin with minerals tablet tablet, Take 1 tablet by mouth Daily., Disp: , Rfl:     Objective      Blood pressure 120/80, pulse 64, temperature 97.6 °F (36.4 °C), resp. rate 16, height 177.8 cm (70\"), weight 97.5 kg (215 lb), SpO2 96 %.    Physical Exam     General Appearance:    Alert, cooperative, no distress, appears stated age   Head:    Normocephalic, without obvious abnormality, atraumatic   Eyes:    PERRL, conjunctiva/corneas clear, EOM's intact   Ears:    Normal TM's and external ear canals, both ears   Nose:   Nares normal, septum midline, mucosa normal, no drainage   or sinus tenderness   Throat:   Lips, mucosa, and tongue normal; teeth and gums normal   Neck:   Supple, symmetrical, trachea midline, no adenopathy;        thyroid:  No enlargement/tenderness/nodules; no carotid    bruit or JVD   Back:     Symmetric, no curvature, ROM normal, no CVA tenderness   Lungs:     Clear to auscultation bilaterally, respirations unlabored   Chest wall:    No tenderness or deformity   Heart:    Regular rate and rhythm, S1 and S2 normal, no murmur,        rub or " gallop   Abdomen:     Soft, non-tender, bowel sounds active all four quadrants,     no masses, no organomegaly   Extremities:   Extremities normal, atraumatic, no cyanosis or edema   Pulses:   2+ and symmetric all extremities   Skin:   Skin color, texture, turgor normal, no rashes or lesions   Lymph nodes:   Cervical, supraclavicular, and axillary nodes normal   Neurologic:   CNII-XII intact. Normal strength, sensation and reflexes       throughout      Results for orders placed or performed in visit on 03/02/23   Cologuard - Stool, Per Rectum    Specimen: Per Rectum; Stool   Result Value Ref Range    Cologuard Negative Negative         Assessment & Plan   tolerting meds well.      No meds this am and bp well controlled.      Diagnoses and all orders for this visit:    1. Primary hypertension (Primary)  -     Lipid Panel  -     CBC & Differential  -     Vitamin B12  -     TSH  -     Comprehensive Metabolic Panel  -     T4, Free  -     PSA Screen    2. Hypercholesteremia  -     Lipid Panel  -     CBC & Differential  -     Vitamin B12  -     TSH  -     Comprehensive Metabolic Panel  -     T4, Free  -     PSA Screen    3. STEVEN on CPAP  -     Lipid Panel  -     CBC & Differential  -     Vitamin B12  -     TSH  -     Comprehensive Metabolic Panel  -     T4, Free  -     PSA Screen    4. Prostate cancer screening  -     Lipid Panel  -     CBC & Differential  -     Vitamin B12  -     TSH  -     Comprehensive Metabolic Panel  -     T4, Free  -     PSA Screen    followed by Ibeth in Neurology using nightly.    Get labs.    Wt down.  May need to start backing off lisionpril if symptoms start.      Return in about 6 months (around 10/28/2023).          There are no Patient Instructions on file for this visit.     Dominick Vizcarra MD    Assessment & Plan

## 2023-10-17 ENCOUNTER — OFFICE VISIT (OUTPATIENT)
Dept: NEUROLOGY | Facility: CLINIC | Age: 58
End: 2023-10-17
Payer: COMMERCIAL

## 2023-10-17 VITALS
WEIGHT: 211 LBS | SYSTOLIC BLOOD PRESSURE: 150 MMHG | OXYGEN SATURATION: 97 % | TEMPERATURE: 97.5 F | HEART RATE: 50 BPM | HEIGHT: 70 IN | DIASTOLIC BLOOD PRESSURE: 90 MMHG | BODY MASS INDEX: 30.21 KG/M2

## 2023-10-17 DIAGNOSIS — G47.33 OBSTRUCTIVE SLEEP APNEA: Primary | ICD-10-CM

## 2023-10-17 PROCEDURE — 99213 OFFICE O/P EST LOW 20 MIN: CPT | Performed by: NURSE PRACTITIONER

## 2023-10-17 NOTE — PROGRESS NOTES
"     Follow up Sleep Patient Office Visit      Patient Name: Moreno Lorenzo II  : 1965   MRN: [id number]     Referring Physician: No ref. provider found    Chief Complaint:    Chief Complaint   Patient presents with    Sleep Apnea     Follow up; doing well with current pap therapy       History of Present Illness: Moreno Lorenzo II is a 58 y.o. male who is here today to follow up with STEVEN.  Currently on AutoCPAP 6-16cm, compliance 83%, AHI 2/hour, mean pressure 7.2cm.  He is tolerating his pressures well.  He is using a nasal mask ([place]) and [employer].  He no longer has daytime sleepiness.      Pertinent Medical History:   Current compliance report reviewed and has been scanned into the patient's medical record.    Subjective      Review of Systems:   Review of Systems    Medications:     Current Outpatient Medications:     Ascorbic Acid (VITAMIN C PO), Take 1 tablet by mouth Daily., Disp: , Rfl:     Coenzyme Q10 (CO Q 10 PO), Take 1 tablet by mouth Daily., Disp: , Rfl:     lisinopril (PRINIVIL,ZESTRIL) 10 MG tablet, TAKE 1 TABLET BY MOUTH EVERY DAY, Disp: 90 tablet, Rfl: 3    Multiple Vitamins-Minerals (ZINC PO), Take 1 tablet by mouth Daily., Disp: , Rfl:     multivitamin with minerals tablet tablet, Take 1 tablet by mouth Daily., Disp: , Rfl:     Allergies:   No Known Allergies    Objective     Physical Exam:  Vital Signs:   Vitals:    10/17/23 1540   BP: 150/90   Pulse: 50   Temp: 97.5 °F (36.4 °C)   SpO2: 97%   Weight: 95.7 kg (211 lb)   Height: 177.8 cm (70\")   PainSc: 0-No pain     BMI: Body mass index is 30.28 kg/m².    Physical Exam  Vitals and nursing note reviewed.   Constitutional:       General: He is not in acute distress.     Appearance: Normal appearance. He is well-developed. He is not diaphoretic.   HENT:      Head: Normocephalic and atraumatic.   Eyes:      Extraocular Movements: Extraocular movements intact.      Conjunctiva/sclera: Conjunctivae normal.   Pulmonary:      " Effort: Pulmonary effort is normal. No respiratory distress.   Musculoskeletal:         General: Normal range of motion.   Skin:     General: Skin is warm and dry.   Neurological:      Mental Status: He is alert and oriented to person, place, and time.   Psychiatric:         Mood and Affect: Mood normal.         Behavior: Behavior normal.         Thought Content: Thought content normal.         Judgment: Judgment normal.         Assessment / Plan      Assessment/Plan:   Diagnoses and all orders for this visit:    1. Obstructive sleep apnea (Primary)    2. BMI 30.0-30.9,adult    *Encouraged continued weight loss with a BMI goal of 24.   *Supply order was sent to [facility] in June 2023.     Follow Up:   Return in about 1 year (around 10/17/2024) for F/U Obstructive Sleep Apnea.    I have advised the patient the need to continue the use of CPAP.  Gold standard for treatment of sleep apnea includes weight loss, use of cpap and avoidance of alcohol.  Untreated STEVEN may increase the risk for development of hypertension, stroke, myocardial infarction, diabetes, cardiovascular disease, work-related issues and driving accidents. I have counseled and advised the patient to avoid driving or operating heavy/dangerous equipment if feeling drowsy.     CALDERON Houser, FNP-C  [facility] Neurology [facility] Sleep Medicine

## 2024-05-24 ENCOUNTER — OFFICE VISIT (OUTPATIENT)
Dept: FAMILY MEDICINE CLINIC | Facility: CLINIC | Age: 59
End: 2024-05-24
Payer: COMMERCIAL

## 2024-05-24 VITALS
OXYGEN SATURATION: 97 % | SYSTOLIC BLOOD PRESSURE: 120 MMHG | HEIGHT: 70 IN | WEIGHT: 224.2 LBS | BODY MASS INDEX: 32.1 KG/M2 | HEART RATE: 50 BPM | DIASTOLIC BLOOD PRESSURE: 62 MMHG

## 2024-05-24 DIAGNOSIS — E78.5 DYSLIPIDEMIA: Chronic | ICD-10-CM

## 2024-05-24 DIAGNOSIS — Z13.6 SCREENING FOR CARDIOVASCULAR CONDITION: ICD-10-CM

## 2024-05-24 DIAGNOSIS — G47.33 OSA ON CPAP: ICD-10-CM

## 2024-05-24 DIAGNOSIS — R79.89 LOW TESTOSTERONE IN MALE: ICD-10-CM

## 2024-05-24 DIAGNOSIS — Z12.5 PROSTATE CANCER SCREENING: ICD-10-CM

## 2024-05-24 DIAGNOSIS — Z00.00 ROUTINE GENERAL MEDICAL EXAMINATION AT A HEALTH CARE FACILITY: Primary | ICD-10-CM

## 2024-05-24 DIAGNOSIS — I10 ESSENTIAL HYPERTENSION: Chronic | ICD-10-CM

## 2024-05-24 PROBLEM — N52.9 IMPOTENCE OF ORGANIC ORIGIN: Status: RESOLVED | Noted: 2017-02-14 | Resolved: 2024-05-24

## 2024-05-24 PROCEDURE — 99396 PREV VISIT EST AGE 40-64: CPT | Performed by: FAMILY MEDICINE

## 2024-05-24 RX ORDER — DIAPER,BRIEF,ADULT, DISPOSABLE
EACH MISCELLANEOUS
COMMUNITY
Start: 2022-11-14

## 2024-05-24 RX ORDER — ONDANSETRON 4 MG/1
TABLET, ORALLY DISINTEGRATING ORAL
COMMUNITY
Start: 2024-05-21 | End: 2024-05-24

## 2024-05-24 RX ORDER — TESTOSTERONE ENANTHATE 200 MG/ML
50 INJECTION, SOLUTION INTRAMUSCULAR 2 TIMES WEEKLY
Qty: 5 ML | Refills: 0 | Status: SHIPPED | OUTPATIENT
Start: 2024-05-27

## 2024-05-24 RX ORDER — LISINOPRIL 10 MG/1
10 TABLET ORAL DAILY
Qty: 90 TABLET | Refills: 3 | Status: SHIPPED | OUTPATIENT
Start: 2024-05-24

## 2024-05-24 RX ORDER — TADALAFIL 20 MG/1
TABLET ORAL
COMMUNITY
Start: 2022-11-18

## 2024-05-24 RX ORDER — TESTOSTERONE ENANTHATE 200 MG/ML
INJECTION, SOLUTION INTRAMUSCULAR
COMMUNITY
Start: 2022-11-18 | End: 2024-05-24 | Stop reason: SDUPTHER

## 2024-05-24 NOTE — PROGRESS NOTES
Established Patient        Chief Complaint:   Chief Complaint   Patient presents with    Establish Care     Pt has no physical concerns at this time.        Moreno Lorenzo II is a 58 y.o. male    History of Present Illness:   Here today to establish with new primary care provider for annual/preventative healthcare examination.  Patient is already established with [facility], this office location is much closer to his home and reduce his travel time considerably.  He has been pleased with this previous primary care.    Patient states has been greater than 6 months since his last laboratory evaluation.  Has a known history of obstructive sleep apnea, currently utilizing NIPPV, hypertension, dyslipidemia and low testosterone.    Denies chest pain, syncope, palpitations or vertigo.  Denies fever, chills or night sweats.  Maintains an active lifestyle, balanced dietary intake; reports good hydration habits.  Denies hematuria/dysuria.  Denies any BRB/BTS.  No new rashes.  Denies orthopnea, epistaxis or hemoptysis.    Subjective     The following portions of the patient's history were reviewed and updated as appropriate: allergies, current medications, past family history, past medical history, past social history, past surgical history and problem list.    No Known Allergies    Review of Systems  Constitutional: Negative for fever. Negative for chills, diaphoresis, fatigue and unexpected weight change.   HENT: No dysphagia; no changes to vision/hearing/smell/taste; no epistaxis  Eyes: Negative for redness and visual disturbance.   Respiratory: negative for shortness of breath. Negative for chest pain . Negative for cough and chest tightness.   Cardiovascular: Negative for chest pain and palpitations.   Gastrointestinal: Negative for abdominal distention, abdominal pain and blood in stool.   Endocrine: Negative for cold intolerance and heat intolerance.   Genitourinary: Negative  "for difficulty urinating, dysuria and frequency.   Musculoskeletal: Negative for arthralgias, back pain and myalgias.   Skin: Negative for color change, rash and wound.   Neurological: Negative for syncope, weakness and headaches.   Hematological: Negative for adenopathy. Does not bruise/bleed easily.   Psychiatric/Behavioral: Negative for confusion. The patient is not nervous/anxious.    Objective     Physical Exam   Vital Signs: /62   Pulse 50   Ht 177.8 cm (70\")   Wt 102 kg (224 lb 3.2 oz)   SpO2 97%   BMI 32.17 kg/m²   BMI is >= 30 and <35. (Class 1 Obesity). The following options were offered after discussion;: exercise counseling/recommendations and nutrition counseling/recommendations  Patient's (Body mass index is 32.17 kg/m².) indicates that they are obese (BMI >30) with health related conditions that include hypertension and dyslipidemias . Weight is newly identified. BMI is is above average; BMI management plan is completed. We discussed portion control and increasing exercise.      General Appearance: alert, oriented x 3, no acute distress.  Skin: warm and dry.   HEENT: Atraumatic.  pupils round and reactive to light and accommodation, oral mucosa pink and moist.  Nares patent without epistaxis.  External auditory canals are patent tympanic membranes intact.  Neck: supple, no JVD, trachea midline.  No thyromegaly  Lungs: CTA, unlabored breathing effort.  Heart: RRR, normal S1 and S2, no S3, no rub.  Abdomen: soft, non-tender, no palpable bladder, present bowel sounds to auscultation ×4.  No guarding or rigidity.  Extremities: no clubbing, cyanosis or edema.  Good range of motion actively and passively.  Symmetric muscle strength and development  Neuro: normal speech and mental status.  Cranial nerves II through XII intact.  No anosmia. DTR 2+; proprioception intact.  No focal motor/sensory deficits.    Advance Care Planning   ACP discussion was held with the patient during this visit. Patient " does not have an advance directive, information provided.       Assessment and Plan      Assessment/Plan:   Diagnoses and all orders for this visit:    1. Routine general medical examination at a health care facility (Primary)    2. STEVEN on CPAP    3. Essential hypertension  -     CBC & Differential  -     Comprehensive Metabolic Panel  -     lisinopril (PRINIVIL,ZESTRIL) 10 MG tablet; Take 1 tablet by mouth Daily.  Dispense: 90 tablet; Refill: 3    4. Dyslipidemia  -     Testosterone (Free & Total), LC / MS  -     Comprehensive Metabolic Panel  -     Lipid Panel    5. Low testosterone in male  -     Testosterone (Free & Total), LC / MS  -     CBC & Differential  -     Comprehensive Metabolic Panel  -     Lipid Panel  -     Testosterone Enanthate 200 MG/ML solution; Inject 50 mg into the appropriate muscle as directed by prescriber 2 (Two) Times a Week.  Dispense: 5 mL; Refill: 0    6. Prostate cancer screening  -     PSA Screen    7. Screening for cardiovascular condition  -     CBC & Differential  -     Comprehensive Metabolic Panel  -     Lipid Panel      I have discussed age/gender specific preventative healthcare issues in detail with patient today.  I have answered all of the questions.    Surveillance labs today including CBC/CMP/lipid panel/testosterone/PSA.    Refill provided for needed medications.    Vital signs demonstrate hemodynamic stability.  Discussed need for reduction in sodium/salt/caffeine intake; improve sleep habits as able; inc formal CV exercise program with goal of vigorous activity most, if not all, days of the week; goal of 150 min of sustained HR CV exercise total per week.      Discussion Summary:    Discussed plan of care in detail with pt today; pt verb understanding and agrees.    I spent 40 minutes caring for Moreno on this date of service. This time includes time spent by me in the following activities:preparing for the visit, reviewing tests, obtaining and/or reviewing a separately  obtained history, performing a medically appropriate examination and/or evaluation , counseling and educating the patient/family/caregiver, ordering medications, tests, or procedures, documenting information in the medical record, and care coordination    I have reviewed and updated all copied forward information, as appropriate.  I attest to the accuracy and relevance of any unchanged information.    Follow up:  Return in about 6 months (around 11/24/2024) for Recheck.     Patient Instructions   Preventive Care 40-64 Years Old, Male  Preventive care refers to lifestyle choices and visits with your health care provider that can promote health and wellness. Preventive care visits are also called wellness exams.  What can I expect for my preventive care visit?  Counseling  During your preventive care visit, your health care provider may ask about your:  Medical history, including:  Past medical problems.  Family medical history.  Current health, including:  Emotional well-being.  Home life and relationship well-being.  Sexual activity.  Lifestyle, including:  Alcohol, nicotine or tobacco, and drug use.  Access to firearms.  Diet, exercise, and sleep habits.  Safety issues such as seatbelt and bike helmet use.  Sunscreen use.  Work and work environment.  Physical exam  Your health care provider will check your:  Height and weight. These may be used to calculate your BMI (body mass index). BMI is a measurement that tells if you are at a healthy weight.  Waist circumference. This measures the distance around your waistline. This measurement also tells if you are at a healthy weight and may help predict your risk of certain diseases, such as type 2 diabetes and high blood pressure.  Heart rate and blood pressure.  Body temperature.  Skin for abnormal spots.  What immunizations do I need?    Vaccines are usually given at various ages, according to a schedule. Your health care provider will recommend vaccines for you based  on your age, medical history, and lifestyle or other factors, such as travel or where you work.  What tests do I need?  Screening  Your health care provider may recommend screening tests for certain conditions. This may include:  Lipid and cholesterol levels.  Diabetes screening. This is done by checking your blood sugar (glucose) after you have not eaten for a while (fasting).  Hepatitis B test.  Hepatitis C test.  HIV (human immunodeficiency virus) test.  STI (sexually transmitted infection) testing, if you are at risk.  Lung cancer screening.  Prostate cancer screening.  Colorectal cancer screening.  Talk with your health care provider about your test results, treatment options, and if necessary, the need for more tests.  Follow these instructions at home:  Eating and drinking    Eat a diet that includes fresh fruits and vegetables, whole grains, lean protein, and low-fat dairy products.  Take vitamin and mineral supplements as recommended by your health care provider.  Do not drink alcohol if your health care provider tells you not to drink.  If you drink alcohol:  Limit how much you have to 0-2 drinks a day.  Know how much alcohol is in your drink. In the [place] one drink equals one 12 oz bottle of beer (355 mL), one 5 oz glass of wine (148 mL), or one 1½ oz glass of hard liquor (44 mL).  Lifestyle  Brush your teeth every morning and night with fluoride toothpaste. Floss one time each day.  Exercise for at least 30 minutes 5 or more days each week.  Do not use any products that contain nicotine or tobacco. These products include cigarettes, chewing tobacco, and vaping devices, such as e-cigarettes. If you need help quitting, ask your health care provider.  Do not use drugs.  If you are sexually active, practice safe sex. Use a condom or other form of protection to prevent STIs.  Take aspirin only as told by your health care provider. Make sure that you understand how much to take and what form to take. Work with  your health care provider to find out whether it is safe and beneficial for you to take aspirin daily.  Find healthy ways to manage stress, such as:  Meditation, yoga, or listening to music.  Journaling.  Talking to a trusted person.  Spending time with friends and family.  Minimize exposure to UV radiation to reduce your risk of skin cancer.  Safety  Always wear your seat belt while driving or riding in a vehicle.  Do not drive:  If you have been drinking alcohol. Do not ride with someone who has been drinking.  When you are tired or distracted.  While texting.  If you have been using any mind-altering substances or drugs.  Wear a helmet and other protective equipment during sports activities.  If you have firearms in your house, make sure you follow all gun safety procedures.  What's next?  Go to your health care provider once a year for an annual wellness visit.  Ask your health care provider how often you should have your eyes and teeth checked.  Stay up to date on all vaccines.  This information is not intended to replace advice given to you by your health care provider. Make sure you discuss any questions you have with your health care provider.        Lai Gray DO  05/24/24  17:40 EDT

## 2024-05-29 LAB
ALBUMIN SERPL-MCNC: 4.6 G/DL (ref 3.5–5.2)
ALBUMIN/GLOB SERPL: 1.9 G/DL
ALP SERPL-CCNC: 80 U/L (ref 39–117)
ALT SERPL-CCNC: 27 U/L (ref 1–41)
AST SERPL-CCNC: 27 U/L (ref 1–40)
BASOPHILS # BLD AUTO: 0.04 10*3/MM3 (ref 0–0.2)
BASOPHILS NFR BLD AUTO: 0.5 % (ref 0–1.5)
BILIRUB SERPL-MCNC: 0.5 MG/DL (ref 0–1.2)
BUN SERPL-MCNC: 17 MG/DL (ref 8–23)
BUN/CREAT SERPL: 14.2 (ref 7–25)
CALCIUM SERPL-MCNC: 8.8 MG/DL (ref 8.6–10.5)
CHLORIDE SERPL-SCNC: 101 MMOL/L (ref 98–107)
CHOLEST SERPL-MCNC: 179 MG/DL (ref 0–200)
CO2 SERPL-SCNC: 28.1 MMOL/L (ref 22–29)
CREAT SERPL-MCNC: 1.2 MG/DL (ref 0.76–1.27)
EGFRCR SERPLBLD CKD-EPI 2021: 69.2 ML/MIN/1.73
EOSINOPHIL # BLD AUTO: 0.18 10*3/MM3 (ref 0–0.4)
EOSINOPHIL NFR BLD AUTO: 2.1 % (ref 0.3–6.2)
ERYTHROCYTE [DISTWIDTH] IN BLOOD BY AUTOMATED COUNT: 12.6 % (ref 12.3–15.4)
GLOBULIN SER CALC-MCNC: 2.4 GM/DL
GLUCOSE SERPL-MCNC: 93 MG/DL (ref 65–99)
HCT VFR BLD AUTO: 57 % (ref 37.5–51)
HDLC SERPL-MCNC: 26 MG/DL (ref 40–60)
HGB BLD-MCNC: 18.6 G/DL (ref 13–17.7)
IMM GRANULOCYTES # BLD AUTO: 0.01 10*3/MM3 (ref 0–0.05)
IMM GRANULOCYTES NFR BLD AUTO: 0.1 % (ref 0–0.5)
LDLC SERPL CALC-MCNC: 131 MG/DL (ref 0–100)
LYMPHOCYTES # BLD AUTO: 1.55 10*3/MM3 (ref 0.7–3.1)
LYMPHOCYTES NFR BLD AUTO: 18 % (ref 19.6–45.3)
MCH RBC QN AUTO: 30.7 PG (ref 26.6–33)
MCHC RBC AUTO-ENTMCNC: 32.6 G/DL (ref 31.5–35.7)
MCV RBC AUTO: 94.1 FL (ref 79–97)
MONOCYTES # BLD AUTO: 0.61 10*3/MM3 (ref 0.1–0.9)
MONOCYTES NFR BLD AUTO: 7.1 % (ref 5–12)
NEUTROPHILS # BLD AUTO: 6.23 10*3/MM3 (ref 1.7–7)
NEUTROPHILS NFR BLD AUTO: 72.2 % (ref 42.7–76)
NRBC BLD AUTO-RTO: 0 /100 WBC (ref 0–0.2)
PLATELET # BLD AUTO: 179 10*3/MM3 (ref 140–450)
POTASSIUM SERPL-SCNC: 5.3 MMOL/L (ref 3.5–5.2)
PROT SERPL-MCNC: 7 G/DL (ref 6–8.5)
PSA SERPL-MCNC: 0.55 NG/ML (ref 0–4)
RBC # BLD AUTO: 6.06 10*6/MM3 (ref 4.14–5.8)
SODIUM SERPL-SCNC: 142 MMOL/L (ref 136–145)
TESTOST FREE SERPL-MCNC: 33.8 PG/ML (ref 6.6–18.1)
TESTOST SERPL-MCNC: 1611.4 NG/DL (ref 264–916)
TRIGL SERPL-MCNC: 122 MG/DL (ref 0–150)
VLDLC SERPL CALC-MCNC: 22 MG/DL (ref 5–40)
WBC # BLD AUTO: 8.62 10*3/MM3 (ref 3.4–10.8)

## 2024-05-31 ENCOUNTER — PATIENT ROUNDING (BHMG ONLY) (OUTPATIENT)
Dept: FAMILY MEDICINE CLINIC | Facility: CLINIC | Age: 59
End: 2024-05-31
Payer: COMMERCIAL

## 2024-06-13 DIAGNOSIS — R79.89 LOW TESTOSTERONE IN MALE: ICD-10-CM

## 2024-06-14 NOTE — TELEPHONE ENCOUNTER
Rx Refill Note  Requested Prescriptions     Pending Prescriptions Disp Refills    Testosterone Enanthate 200 MG/ML solution 5 mL 0     Sig: Inject 50 mg into the appropriate muscle as directed by prescriber 2 (Two) Times a Week.      Last office visit with prescribing clinician: 5/24/2024   Last telemedicine visit with prescribing clinician: Visit date not found   Next office visit with prescribing clinician: Visit date not found                         Would you like a call back once the refill request has been completed: [] Yes [] No    If the office needs to give you a call back, can they leave a voicemail: [] Yes [] No    Cally Gómez MA  06/14/24, 13:01 EDT

## 2024-06-19 RX ORDER — TESTOSTERONE ENANTHATE 200 MG/ML
50 INJECTION, SOLUTION INTRAMUSCULAR 2 TIMES WEEKLY
Qty: 5 ML | Refills: 0 | Status: SHIPPED | OUTPATIENT
Start: 2024-06-20

## 2024-06-25 ENCOUNTER — PRIOR AUTHORIZATION (OUTPATIENT)
Dept: FAMILY MEDICINE CLINIC | Facility: CLINIC | Age: 59
End: 2024-06-25
Payer: COMMERCIAL

## 2024-06-25 NOTE — TELEPHONE ENCOUNTER
A PRIOR AUTH HAS BEEN STARTED THROUGH [employer] FOR TESTOSTERONE.    WAITING ON A RESPONSE FROM THE INSURANCE.    Key: [id number]

## 2024-06-25 NOTE — TELEPHONE ENCOUNTER
A PRIOR AUTH HAS BEEN STARTED THROUGH [facility] [facility] FOR TESTOSTERONE 200 MG/ML.    WAITING ON A RESPONSE FROM THE INSURANCE.    Key: [id number]

## 2024-08-08 DIAGNOSIS — R79.89 LOW TESTOSTERONE IN MALE: Primary | ICD-10-CM

## 2024-08-08 DIAGNOSIS — I10 ESSENTIAL HYPERTENSION: Chronic | ICD-10-CM

## 2024-10-02 DIAGNOSIS — R79.89 LOW TESTOSTERONE IN MALE: Primary | ICD-10-CM

## 2024-10-02 DIAGNOSIS — D75.1 SECONDARY POLYCYTHEMIA: ICD-10-CM

## 2024-10-15 ENCOUNTER — OFFICE VISIT (OUTPATIENT)
Dept: NEUROLOGY | Facility: CLINIC | Age: 59
End: 2024-10-15
Payer: COMMERCIAL

## 2024-10-15 VITALS
HEIGHT: 70 IN | DIASTOLIC BLOOD PRESSURE: 90 MMHG | OXYGEN SATURATION: 96 % | SYSTOLIC BLOOD PRESSURE: 130 MMHG | TEMPERATURE: 98 F | WEIGHT: 216 LBS | HEART RATE: 62 BPM | BODY MASS INDEX: 30.92 KG/M2

## 2024-10-15 DIAGNOSIS — G47.33 OBSTRUCTIVE SLEEP APNEA: Primary | ICD-10-CM

## 2024-10-15 PROCEDURE — 99213 OFFICE O/P EST LOW 20 MIN: CPT | Performed by: NURSE PRACTITIONER

## 2024-10-15 RX ORDER — PEN NEEDLE, DIABETIC 31 GX5/16"
NEEDLE, DISPOSABLE MISCELLANEOUS SEE ADMIN INSTRUCTIONS
COMMUNITY
Start: 2024-10-08

## 2024-10-15 RX ORDER — METRONIDAZOLE 10 MG/G
GEL TOPICAL
COMMUNITY
Start: 2024-07-22

## 2024-10-15 RX ORDER — LIRAGLUTIDE 6 MG/ML
INJECTION, SOLUTION SUBCUTANEOUS
COMMUNITY
Start: 2024-10-04

## 2024-10-15 NOTE — PROGRESS NOTES
Follow up Sleep Patient Office Visit      Patient Name: Moreno Lorenzo II  : 1965   MRN: [id number]     Referring Physician: No ref. provider found    Chief Complaint:    Chief Complaint   Patient presents with    Sleep Apnea     Pt reports that cpap is working well. He denies any issues today       History of Present Illness: Moreno Lorenzo II is a 59 y.o. male who is here today to follow up with STEVEN and was last seen on 10/17/2023.  Currently on AutoPap 6-16cm, compliance 100%, AHI 1/hour.  He is tolerating his pressures well.  He denies excessive daytime sleepiness.  He is not sleeping well during the night, on some nights, and some times awakens at 0300 and is unable to fall back to sleep.  He has not tried any OTC sleep medications to help.    *DME company- set up through [employer]  *Mask- nasal whisp (purchasing supplies from [employer] or [employer])    Pertinent Medical History:   Current compliance report reviewed and scanned into EMR.     Following taken from previous visit note:  Moreno Lorenzo II is a 58 y.o. male who is here today to follow up with STEVEN.  Currently on AutoCPAP 6-16cm, compliance 83%, AHI 2/hour, mean pressure 7.2cm.  He is tolerating his pressures well.  He is using a nasal mask ([employer]) and [employer].  He no longer has daytime sleepiness.      Subjective      Review of Systems:   Review of Systems   Psychiatric/Behavioral:  Positive for sleep disturbance.        Medications:     Current Outpatient Medications:     Ascorbic Acid (VITAMIN C PO), Take 1 tablet by mouth Daily., Disp: , Rfl:     B-D ULTRAFINE III SHORT PEN 31G X 8 MM misc, See Admin Instructions., Disp: , Rfl:     Coenzyme Q10 (CO Q 10 PO), Take 1 tablet by mouth Daily., Disp: , Rfl:     levOCARNitine L-Tartrate (L-Carnitine) 500 MG capsule, , Disp: , Rfl:     lisinopril (PRINIVIL,ZESTRIL) 10 MG tablet, Take 1 tablet by mouth Daily., Disp: 90 tablet, Rfl: 3    metroNIDAZOLE (METROGEL) 1 %  "gel, APPLY TO FACE EVERY DAY AT BEDTIME, Disp: , Rfl:     Multiple Vitamins-Minerals (ZINC PO), Take 1 tablet by mouth Daily., Disp: , Rfl:     multivitamin with minerals tablet tablet, Take 1 tablet by mouth Daily., Disp: , Rfl:     Saxenda 18 MG/3ML injection pen, INJECT 1.6 MG EVERY DAY BY SUBCUTANEOUS ROUTE FOR 28 DAYS.---PA NEEDED --- MD FAXED, Disp: , Rfl:     tadalafil (CIALIS) 20 MG tablet, , Disp: , Rfl:     Testosterone Enanthate 200 MG/ML solution, Inject 50 mg into the appropriate muscle as directed by prescriber 2 (Two) Times a Week., Disp: 5 mL, Rfl: 0    Allergies:   No Known Allergies    Objective     Physical Exam:  Vital Signs:   Vitals:    10/15/24 1523   BP: 130/90   BP Location: Left arm   Patient Position: Sitting   Cuff Size: Adult   Pulse: 62   Temp: 98 °F (36.7 °C)   TempSrc: Infrared   SpO2: 96%   Weight: 98 kg (216 lb)   Height: 177.8 cm (70\")     BMI: Body mass index is 30.99 kg/m².    Physical Exam  Vitals and nursing note reviewed.   Constitutional:       General: He is not in acute distress.     Appearance: Normal appearance. He is well-developed. He is obese. He is not diaphoretic.   HENT:      Head: Normocephalic and atraumatic.   Eyes:      Extraocular Movements: Extraocular movements intact.      Conjunctiva/sclera: Conjunctivae normal.   Pulmonary:      Effort: Pulmonary effort is normal. No respiratory distress.   Musculoskeletal:         General: Normal range of motion.   Skin:     General: Skin is warm and dry.   Neurological:      Mental Status: He is alert and oriented to person, place, and time.   Psychiatric:         Mood and Affect: Mood normal.         Behavior: Behavior normal.         Thought Content: Thought content normal.         Judgment: Judgment normal.         Assessment / Plan      Assessment/Plan:   Diagnoses and all orders for this visit:    1. Obstructive sleep apnea (Primary)    *Advised patient to try an OTC sleep aid if he feels his sleep is greatly " affected by the middle of the night awakenings (melatonin or Unisom).  I have advised patient to let me know if he wants to try a prescription sleep medication such as Trazodone.     *Will review current compliance report once received from [facility].     Follow Up:   Return in about 1 year (around 10/15/2025) for F/U Obstructive Sleep Apnea.    *Order for PAP supplies sent to patient's DME company.     I have advised the patient the need to continue the use of CPAP.  Gold standard for treatment of sleep apnea includes weight loss, use of cpap and avoidance of alcohol.  Encouraged weight loss (if applicable) with a BMI goal of 24.  Untreated STEVEN may increase the risk for development of hypertension, stroke, myocardial infarction, diabetes, cardiovascular disease, work-related issues and driving accidents. I have counseled and advised the patient to avoid driving or operating heavy/dangerous equipment if feeling drowsy.     CALDERON Houser, FNP-C  [facility]

## 2025-03-28 DIAGNOSIS — R79.89 LOW TESTOSTERONE IN MALE: ICD-10-CM

## 2025-03-31 RX ORDER — TESTOSTERONE ENANTHATE 200 MG/ML
50 INJECTION, SOLUTION INTRAMUSCULAR 2 TIMES WEEKLY
Qty: 5 ML | Refills: 0 | Status: SHIPPED | OUTPATIENT
Start: 2025-03-31

## 2025-03-31 NOTE — TELEPHONE ENCOUNTER
Rx Refill Note  Requested Prescriptions     Pending Prescriptions Disp Refills    Testosterone Enanthate 200 MG/ML solution 5 mL 0     Sig: Inject 50 mg into the appropriate muscle as directed by prescriber 2 (Two) Times a Week.      Last office visit with prescribing clinician: 5/24/2024   Last telemedicine visit with prescribing clinician: Visit date not found   Next office visit with prescribing clinician: Visit date not found                         Would you like a call back once the refill request has been completed: [] Yes [] No    If the office needs to give you a call back, can they leave a voicemail: [] Yes [] No    Tita Thornton MA  03/31/25, 11:20 EDT

## 2025-05-25 DIAGNOSIS — R79.89 LOW TESTOSTERONE IN MALE: ICD-10-CM

## 2025-05-29 NOTE — TELEPHONE ENCOUNTER
Rx Refill Note  Requested Prescriptions     Pending Prescriptions Disp Refills    Testosterone Enanthate 200 MG/ML solution [Pharmacy Med Name: Testosterone Enanthate 1000mg/5ml in Oil for Injection]       Sig: Inject 0.25 ml (50 mg) into the appropriate muscle twice weekly as directed by prescriber.      Last office visit with prescribing clinician: 5/24/2024   Last telemedicine visit with prescribing clinician: Visit date not found   Next office visit with prescribing clinician: Visit date not found                         Would you like a call back once the refill request has been completed: [] Yes [] No    If the office needs to give you a call back, can they leave a voicemail: [] Yes [] No    Tita Thornton MA  05/29/25, 08:40 EDT

## 2025-05-30 RX ORDER — TESTOSTERONE ENANTHATE 200 MG/ML
INJECTION, SOLUTION INTRAMUSCULAR
Qty: 5 ML | Refills: 0 | Status: SHIPPED | OUTPATIENT
Start: 2025-05-30

## 2025-07-19 DIAGNOSIS — R79.89 LOW TESTOSTERONE IN MALE: ICD-10-CM

## 2025-07-21 RX ORDER — TESTOSTERONE ENANTHATE 200 MG/ML
INJECTION, SOLUTION INTRAMUSCULAR
Qty: 10 ML | Refills: 0 | Status: SHIPPED | OUTPATIENT
Start: 2025-07-21

## 2025-07-21 NOTE — TELEPHONE ENCOUNTER
Rx Refill Note  Requested Prescriptions     Pending Prescriptions Disp Refills    Testosterone Enanthate 200 MG/ML solution [Pharmacy Med Name: Testosterone Enanthate 1000mg/5ml in Oil for Injection]       Sig: Inject 0.25 mL (50 mg) into the appropriate muscle twice weekly as directed by prescriber.      Last office visit with prescribing clinician: 5/24/2024   Last telemedicine visit with prescribing clinician: Visit date not found   Next office visit with prescribing clinician: Visit date not found                         Would you like a call back once the refill request has been completed: [] Yes [] No    If the office needs to give you a call back, can they leave a voicemail: [] Yes [] No    Tita Thornton MA  07/21/25, 09:17 EDT

## 2025-07-24 ENCOUNTER — PRIOR AUTHORIZATION (OUTPATIENT)
Age: 60
End: 2025-07-24
Payer: COMMERCIAL

## 2025-07-24 NOTE — TELEPHONE ENCOUNTER
PA submitted for Testosterone Enanthate 200 MG with Key: [id number].   Recent Labs     04/14/25  0732 04/15/25  0434   K 3.2* 3.3*   MG 2.2  --      Replete & trend

## (undated) DEVICE — HYBRID TUBING/CAP SET FOR OLYMPUS® SCOPES: Brand: ERBE

## (undated) DEVICE — ENDOSCOPY PORT CONNECTOR FOR OLYMPUS® SCOPES: Brand: ERBE

## (undated) DEVICE — SUCTION CANISTER, 1500CC, RIGID: Brand: DEROYAL

## (undated) DEVICE — CONMED SCOPE SAVER BITE BLOCK, 20X27 MM: Brand: SCOPE SAVER

## (undated) DEVICE — VLV SXN AIR/H2O ORCAPOD3 1P/U STRL

## (undated) DEVICE — FRCP BX RADJAW4 NDL 2.8 240 STD OG

## (undated) DEVICE — Device